# Patient Record
Sex: MALE | Race: WHITE | NOT HISPANIC OR LATINO | Employment: FULL TIME | URBAN - METROPOLITAN AREA
[De-identification: names, ages, dates, MRNs, and addresses within clinical notes are randomized per-mention and may not be internally consistent; named-entity substitution may affect disease eponyms.]

---

## 2021-01-28 ENCOUNTER — TELEPHONE (OUTPATIENT)
Dept: CARDIOLOGY | Facility: MEDICAL CENTER | Age: 61
End: 2021-01-28

## 2021-01-28 NOTE — TELEPHONE ENCOUNTER
Spoke with pt and confirmed this will be first time seeing a cardiologist. Confirmed with pt that all up to date cardiac records are in pts chart.    Appt time and date confirmed.

## 2021-03-08 ENCOUNTER — OFFICE VISIT (OUTPATIENT)
Dept: CARDIOLOGY | Facility: MEDICAL CENTER | Age: 61
End: 2021-03-08
Payer: COMMERCIAL

## 2021-03-08 VITALS
OXYGEN SATURATION: 93 % | RESPIRATION RATE: 18 BRPM | SYSTOLIC BLOOD PRESSURE: 134 MMHG | HEART RATE: 108 BPM | HEIGHT: 74 IN | DIASTOLIC BLOOD PRESSURE: 88 MMHG | BODY MASS INDEX: 40.43 KG/M2 | WEIGHT: 315 LBS

## 2021-03-08 DIAGNOSIS — I51.7 RIGHT HEART ENLARGEMENT: ICD-10-CM

## 2021-03-08 DIAGNOSIS — E66.01 MORBID OBESITY (HCC): ICD-10-CM

## 2021-03-08 DIAGNOSIS — G47.33 OBSTRUCTIVE SLEEP APNEA ON CPAP: ICD-10-CM

## 2021-03-08 DIAGNOSIS — I87.2 CHRONIC VENOUS INSUFFICIENCY: ICD-10-CM

## 2021-03-08 DIAGNOSIS — I10 ESSENTIAL HYPERTENSION, BENIGN: ICD-10-CM

## 2021-03-08 PROCEDURE — 99204 OFFICE O/P NEW MOD 45 MIN: CPT | Performed by: INTERNAL MEDICINE

## 2021-03-08 RX ORDER — FUROSEMIDE 40 MG/1
TABLET ORAL
COMMUNITY
Start: 2021-03-02 | End: 2021-07-26

## 2021-03-08 RX ORDER — SPIRONOLACTONE 50 MG/1
TABLET, FILM COATED ORAL
COMMUNITY
Start: 2021-03-02 | End: 2021-07-26

## 2021-03-08 RX ORDER — HYDROCHLOROTHIAZIDE 12.5 MG/1
TABLET ORAL
COMMUNITY
Start: 2021-02-15 | End: 2021-07-26

## 2021-03-08 RX ORDER — AMOXICILLIN AND CLAVULANATE POTASSIUM 875; 125 MG/1; MG/1
TABLET, FILM COATED ORAL
COMMUNITY
Start: 2021-01-15 | End: 2021-03-08

## 2021-03-08 RX ORDER — POTASSIUM CHLORIDE 1500 MG/1
20 TABLET, EXTENDED RELEASE ORAL
COMMUNITY
Start: 2021-02-16

## 2021-03-08 NOTE — PROGRESS NOTES
Cardiology Initial Consultation Note    Date of note:    3/8/2021    Primary Care Provider: Emily Brady P.A.-C.  Referring Provider: No ref. provider found     Patient Name: Kulwinder Cason     YOB: 1960  MRN:              [id number]    Chief Complaint: Right heart enlargement, chronic lower extremity edema, obesity    Kulwinder Csaon is a 60 y.o. male  patient presented today to establish care for above problems.  For last several months he has been dealing with significant lower extremity edema resistant to diuretics.  He has been using compression socks, diuretics, lymphatic stimulators.  He still has significant lower extremity edema skin changes, weeping sores in his lower extremities.  He has Obesity, working with a nutritionist with diet.  He uses CPAP regularly.    ROS  Positive for fatigue, joint pains, lower extremity swelling    All other systems reviewed and discussed using a comprehensive questionnaire and are negative.     Past medical history, family history, social history, allergies and labs are reviewed and updated as needed as documented below.    History reviewed. No pertinent past medical history.      History reviewed. No pertinent surgical history.      Current Outpatient Medications   Medication Sig Dispense Refill   • furosemide (LASIX) 40 MG Tab      • hydroCHLOROthiazide (HYDRODIURIL) 12.5 MG tablet      • potassium Chloride ER (K-TAB) 20 MEQ Tab CR tablet      • spironolactone (ALDACTONE) 50 MG Tab      • Multiple Vitamins-Minerals (DAILY MULTI PO) Take  by mouth.     • Coenzyme Q10 (COQ10 PO) Take  by mouth.     • aspirin EC (ECOTRIN) 81 MG Tablet Delayed Response Take 81 mg by mouth every day.     • LOSARTAN POTASSIUM PO Take  by mouth.     • amoxicillin-clavulanate (AUGMENTIN) 875-125 MG Tab        No current facility-administered medications for this visit.         Allergies   Allergen Reactions   • Codeine    • Latex          History reviewed. No  "pertinent family history.      Social History     Socioeconomic History   • Marital status: Unknown     Spouse name: Not on file   • Number of children: Not on file   • Years of education: Not on file   • Highest education level: Not on file   Occupational History   • Not on file   Tobacco Use   • Smoking status: Never Smoker   • Smokeless tobacco: Never Used   Substance and Sexual Activity   • Alcohol use: Not Currently   • Drug use: Never   • Sexual activity: Not on file   Other Topics Concern   • Not on file   Social History Narrative   • Not on file     Social Determinants of Health     Financial Resource Strain:    • Difficulty of Paying Living Expenses:    Food Insecurity:    • Worried About Running Out of Food in the Last Year:    • Ran Out of Food in the Last Year:    Transportation Needs:    • Lack of Transportation (Medical):    • Lack of Transportation (Non-Medical):    Physical Activity:    • Days of Exercise per Week:    • Minutes of Exercise per Session:    Stress:    • Feeling of Stress :    Social Connections:    • Frequency of Communication with Friends and Family:    • Frequency of Social Gatherings with Friends and Family:    • Attends L[employer] Services:    • Active Member of Clubs or Organizations:    • Attends Club or Organization Meetings:    • Marital Status:    Intimate Partner Violence:    • Fear of Current or Ex-Partner:    • Emotionally Abused:    • Physically Abused:    • Sexually Abused:          Physical Exam:  Ambulatory Vitals  /88 (BP Location: Left arm, Patient Position: Sitting, BP Cuff Size: Adult)   Pulse (!) 108   Resp 18   Ht 1.88 m (6' 2\")   Wt (!) 218 kg (481 lb)   SpO2 93%    Oxygen Therapy:  Pulse Oximetry: 93 %  BP Readings from Last 4 Encounters:   03/08/21 134/88       Weight/BMI: Body mass index is 61.76 kg/m².  Wt Readings from Last 4 Encounters:   03/08/21 (!) 218 kg (481 lb)       General: Well appearing and in no apparent distress  Head: atrumatic  Eyes: " No conjunctival pallor   ENT: normal external appearance of nose and ears  Neck: JVD absent, carotid bruits absent  Lungs: respiratory sounds  normal, additional breath sounds absent  Heart: Regular rhythm,   No palpable thrills on palpation, murmurs absent, no rubs,   Lower extremity edema 3+.   Abdomen: soft, non tender, non distended.  Extremities/MSK: no clubbing, no cyanosis  Neurological: normal orientation, Gait normal   Psychiatric: Appropriate affect, intact judgement and insight  Skin: Warm extremities      Dr. Beto Chairez notes are reviewed from 2020.    Echocardiogram 2020 reviewed shows normal LV function, mildly dilated right ventricle, no significant TR to quantify RVSP.    EKG from 10/22/2020 reviewed, personally interpreted shows normal sinus rhythm    Medical Decision Makin-year-old male patient with morbid obesity, significant lower extremity edema, chronic venous insufficiency, mild RV enlargement.  His mild RV enlargement is due to obesity, sleep apnea.  The degree of enlargement, absence of TR makes it unlikely severe pulmonary hypertension.  At this time I do not recommend invasive work-up like right heart catheterization.  Recommend adjusting diuretics, watching kidney function carefully to help with his edema.  Recommend aggressive weight loss treatment, strongly consider gastric banding/bypass surgery.  Follow-up in a year with repeat echocardiogram to assess RV function.    No follow-ups on file.    This note was dictated using Dragon speech recognition software.    Anthony BAILEY  Interventional cardiologist  [facility], Bld[facility].  [address] [address]  Phone: [phone]  Fax: [phone]

## 2021-03-08 NOTE — LETTER
[facility]   [address]  [place] [address]  Phone: [phone]  Fax: [phone]              Kulwinder Cason  1960    Encounter Date: 3/8/2021    Anthony Sellers M.D.          PROGRESS NOTE:      Cardiology Initial Consultation Note    Date of note:    3/8/2021    Primary Care Provider: Emily Brady P.A.-C.  Referring Provider: No ref. provider found     Patient Name: Kulwinder Cason     YOB: 1960  MRN:              [id number]    Chief Complaint: Right heart enlargement, chronic lower extremity edema, obesity    Kulwinder Cason is a 60 y.o. male  patient presented today to establish care for above problems.  For last several months he has been dealing with significant lower extremity edema resistant to diuretics.  He has been using compression socks, diuretics, lymphatic stimulators.  He still has significant lower extremity edema skin changes, weeping sores in his lower extremities.  He has Obesity, working with a nutritionist with diet.  He uses CPAP regularly.    ROS  Positive for fatigue, joint pains, lower extremity swelling    All other systems reviewed and discussed using a comprehensive questionnaire and are negative.     Past medical history, family history, social history, allergies and labs are reviewed and updated as needed as documented below.    History reviewed. No pertinent past medical history.      History reviewed. No pertinent surgical history.      Current Outpatient Medications   Medication Sig Dispense Refill   • furosemide (LASIX) 40 MG Tab      • hydroCHLOROthiazide (HYDRODIURIL) 12.5 MG tablet      • potassium Chloride ER (K-TAB) 20 MEQ Tab CR tablet      • spironolactone (ALDACTONE) 50 MG Tab      • Multiple Vitamins-Minerals (DAILY MULTI PO) Take  by mouth.     • Coenzyme Q10 (COQ10 PO) Take  by mouth.     • aspirin EC (ECOTRIN) 81 MG Tablet Delayed Response Take 81 mg by mouth every  "day.     • LOSARTAN POTASSIUM PO Take  by mouth.     • amoxicillin-clavulanate (AUGMENTIN) 875-125 MG Tab        No current facility-administered medications for this visit.         Allergies   Allergen Reactions   • Codeine    • Latex          History reviewed. No pertinent family history.      Social History     Socioeconomic History   • Marital status: Unknown     Spouse name: Not on file   • Number of children: Not on file   • Years of education: Not on file   • Highest education level: Not on file   Occupational History   • Not on file   Tobacco Use   • Smoking status: Never Smoker   • Smokeless tobacco: Never Used   Substance and Sexual Activity   • Alcohol use: Not Currently   • Drug use: Never   • Sexual activity: Not on file   Other Topics Concern   • Not on file   Social History Narrative   • Not on file     Social Determinants of Health     Financial Resource Strain:    • Difficulty of Paying Living Expenses:    Food Insecurity:    • Worried About Running Out of Food in the Last Year:    • Ran Out of Food in the Last Year:    Transportation Needs:    • Lack of Transportation (Medical):    • Lack of Transportation (Non-Medical):    Physical Activity:    • Days of Exercise per Week:    • Minutes of Exercise per Session:    Stress:    • Feeling of Stress :    Social Connections:    • Frequency of Communication with Friends and Family:    • Frequency of Social Gatherings with Friends and Family:    • Attends Mandaeism Services:    • Active Member of Clubs or Organizations:    • Attends Club or Organization Meetings:    • Marital Status:    Intimate Partner Violence:    • Fear of Current or Ex-Partner:    • Emotionally Abused:    • Physically Abused:    • Sexually Abused:          Physical Exam:  Ambulatory Vitals  /88 (BP Location: Left arm, Patient Position: Sitting, BP Cuff Size: Adult)   Pulse (!) 108   Resp 18   Ht 1.88 m (6' 2\")   Wt (!) 218 kg (481 lb)   SpO2 93%    Oxygen Therapy:  Pulse " Oximetry: 93 %  BP Readings from Last 4 Encounters:   21 134/88       Weight/BMI: Body mass index is 61.76 kg/m².  Wt Readings from Last 4 Encounters:   21 (!) 218 kg (481 lb)       General: Well appearing and in no apparent distress  Head: atrumatic  Eyes: No conjunctival pallor   ENT: normal external appearance of nose and ears  Neck: JVD absent, carotid bruits absent  Lungs: respiratory sounds  normal, additional breath sounds absent  Heart: Regular rhythm,   No palpable thrills on palpation, murmurs absent, no rubs,   Lower extremity edema 3+.   Abdomen: soft, non tender, non distended.  Extremities/MSK: no clubbing, no cyanosis  Neurological: normal orientation, Gait normal   Psychiatric: Appropriate affect, intact judgement and insight  Skin: Warm extremities      Dr. Beto Chairez notes are reviewed from 2020.    Echocardiogram 2020 reviewed shows normal LV function, mildly dilated right ventricle, no significant TR to quantify RVSP.    EKG from 10/22/2020 reviewed, personally interpreted shows normal sinus rhythm    Medical Decision Makin-year-old male patient with morbid obesity, significant lower extremity edema, chronic venous insufficiency, mild RV enlargement.  His mild RV enlargement is due to obesity, sleep apnea.  The degree of enlargement, absence of TR makes it unlikely severe pulmonary hypertension.  At this time I do not recommend invasive work-up like right heart catheterization.  Recommend adjusting diuretics, watching kidney function carefully to help with his edema.  Recommend aggressive weight loss treatment, strongly consider gastric banding/bypass surgery.  Follow-up in a year with repeat echocardiogram to assess RV function.    No follow-ups on file.    This note was dictated using Dragon speech recognition software.    Anthony BAILEY  Interventional cardiologist  [facility]  [place] [facility], [facility].  [address] [place] [address]  Phone: [phone]  Fax: [phone]                    Beto Chairez M.D.  [facility]  [facility] [address]  Via Fax: [phone]

## 2021-07-26 ENCOUNTER — PRE-ADMISSION TESTING (OUTPATIENT)
Dept: ADMISSIONS | Facility: MEDICAL CENTER | Age: 61
DRG: 619 | End: 2021-07-26
Attending: COLON & RECTAL SURGERY
Payer: COMMERCIAL

## 2021-07-26 VITALS — BODY MASS INDEX: 41.75 KG/M2 | HEIGHT: 73 IN | WEIGHT: 315 LBS

## 2021-07-26 RX ORDER — LOSARTAN POTASSIUM 100 MG/1
100 TABLET ORAL
COMMUNITY

## 2021-08-03 ENCOUNTER — PRE-ADMISSION TESTING (OUTPATIENT)
Dept: ADMISSIONS | Facility: MEDICAL CENTER | Age: 61
DRG: 619 | End: 2021-08-03
Attending: COLON & RECTAL SURGERY
Payer: COMMERCIAL

## 2021-08-03 ENCOUNTER — HOSPITAL ENCOUNTER (OUTPATIENT)
Dept: RADIOLOGY | Facility: MEDICAL CENTER | Age: 61
DRG: 619 | End: 2021-08-03
Attending: COLON & RECTAL SURGERY
Payer: COMMERCIAL

## 2021-08-03 DIAGNOSIS — Z01.812 PRE-OPERATIVE LABORATORY EXAMINATION: ICD-10-CM

## 2021-08-03 DIAGNOSIS — Z01.811 PRE-OPERATIVE RESPIRATORY EXAMINATION: ICD-10-CM

## 2021-08-03 LAB
ALBUMIN SERPL BCP-MCNC: 3.6 G/DL (ref 3.2–4.9)
ALBUMIN/GLOB SERPL: 0.9 G/DL
ALP SERPL-CCNC: 71 U/L (ref 30–99)
ALT SERPL-CCNC: 17 U/L (ref 2–50)
ANION GAP SERPL CALC-SCNC: 14 MMOL/L (ref 7–16)
AST SERPL-CCNC: 21 U/L (ref 12–45)
BASOPHILS # BLD AUTO: 0.6 % (ref 0–1.8)
BASOPHILS # BLD: 0.06 K/UL (ref 0–0.12)
BILIRUB SERPL-MCNC: 0.7 MG/DL (ref 0.1–1.5)
BUN SERPL-MCNC: 11 MG/DL (ref 8–22)
CALCIUM SERPL-MCNC: 9.1 MG/DL (ref 8.5–10.5)
CHLORIDE SERPL-SCNC: 99 MMOL/L (ref 96–112)
CO2 SERPL-SCNC: 24 MMOL/L (ref 20–33)
CREAT SERPL-MCNC: 1.15 MG/DL (ref 0.5–1.4)
EOSINOPHIL # BLD AUTO: 0.16 K/UL (ref 0–0.51)
EOSINOPHIL NFR BLD: 1.6 % (ref 0–6.9)
ERYTHROCYTE [DISTWIDTH] IN BLOOD BY AUTOMATED COUNT: 52.9 FL (ref 35.9–50)
GLOBULIN SER CALC-MCNC: 3.9 G/DL (ref 1.9–3.5)
GLUCOSE SERPL-MCNC: 103 MG/DL (ref 65–99)
HCT VFR BLD AUTO: 47.9 % (ref 42–52)
HGB BLD-MCNC: 14.8 G/DL (ref 14–18)
IMM GRANULOCYTES # BLD AUTO: 0.06 K/UL (ref 0–0.11)
IMM GRANULOCYTES NFR BLD AUTO: 0.6 % (ref 0–0.9)
INR PPP: 1.16 (ref 0.87–1.13)
LYMPHOCYTES # BLD AUTO: 1.55 K/UL (ref 1–4.8)
LYMPHOCYTES NFR BLD: 15.1 % (ref 22–41)
MCH RBC QN AUTO: 27.5 PG (ref 27–33)
MCHC RBC AUTO-ENTMCNC: 30.9 G/DL (ref 33.7–35.3)
MCV RBC AUTO: 89 FL (ref 81.4–97.8)
MONOCYTES # BLD AUTO: 0.95 K/UL (ref 0–0.85)
MONOCYTES NFR BLD AUTO: 9.3 % (ref 0–13.4)
NEUTROPHILS # BLD AUTO: 7.46 K/UL (ref 1.82–7.42)
NEUTROPHILS NFR BLD: 72.8 % (ref 44–72)
NRBC # BLD AUTO: 0 K/UL
NRBC BLD-RTO: 0 /100 WBC
PLATELET # BLD AUTO: 318 K/UL (ref 164–446)
PMV BLD AUTO: 8.9 FL (ref 9–12.9)
POTASSIUM SERPL-SCNC: 4.6 MMOL/L (ref 3.6–5.5)
PROT SERPL-MCNC: 7.5 G/DL (ref 6–8.2)
PROTHROMBIN TIME: 14.5 SEC (ref 12–14.6)
RBC # BLD AUTO: 5.38 M/UL (ref 4.7–6.1)
SARS-COV+SARS-COV-2 AG RESP QL IA.RAPID: NOTDETECTED
SODIUM SERPL-SCNC: 137 MMOL/L (ref 135–145)
SPECIMEN SOURCE: NORMAL
WBC # BLD AUTO: 10.2 K/UL (ref 4.8–10.8)

## 2021-08-03 PROCEDURE — 87426 SARSCOV CORONAVIRUS AG IA: CPT

## 2021-08-03 PROCEDURE — 85610 PROTHROMBIN TIME: CPT

## 2021-08-03 PROCEDURE — 36415 COLL VENOUS BLD VENIPUNCTURE: CPT

## 2021-08-03 PROCEDURE — 80053 COMPREHEN METABOLIC PANEL: CPT

## 2021-08-03 PROCEDURE — 71045 X-RAY EXAM CHEST 1 VIEW: CPT

## 2021-08-03 PROCEDURE — 85025 COMPLETE CBC W/AUTO DIFF WBC: CPT

## 2021-08-04 ENCOUNTER — ANESTHESIA (OUTPATIENT)
Dept: SURGERY | Facility: MEDICAL CENTER | Age: 61
DRG: 619 | End: 2021-08-04
Payer: COMMERCIAL

## 2021-08-04 ENCOUNTER — APPOINTMENT (OUTPATIENT)
Dept: RADIOLOGY | Facility: MEDICAL CENTER | Age: 61
DRG: 619 | End: 2021-08-04
Attending: INTERNAL MEDICINE
Payer: COMMERCIAL

## 2021-08-04 ENCOUNTER — HOSPITAL ENCOUNTER (INPATIENT)
Facility: MEDICAL CENTER | Age: 61
LOS: 5 days | DRG: 619 | End: 2021-08-09
Attending: COLON & RECTAL SURGERY | Admitting: COLON & RECTAL SURGERY
Payer: COMMERCIAL

## 2021-08-04 ENCOUNTER — ANESTHESIA EVENT (OUTPATIENT)
Dept: SURGERY | Facility: MEDICAL CENTER | Age: 61
DRG: 619 | End: 2021-08-04
Payer: COMMERCIAL

## 2021-08-04 DIAGNOSIS — G47.33 OBSTRUCTIVE SLEEP APNEA ON CPAP: ICD-10-CM

## 2021-08-04 DIAGNOSIS — J96.22 ACUTE ON CHRONIC RESPIRATORY FAILURE WITH HYPOXIA AND HYPERCAPNIA (HCC): ICD-10-CM

## 2021-08-04 DIAGNOSIS — E66.01 MORBID OBESITY (HCC): ICD-10-CM

## 2021-08-04 DIAGNOSIS — J96.21 ACUTE ON CHRONIC RESPIRATORY FAILURE WITH HYPOXIA AND HYPERCAPNIA (HCC): ICD-10-CM

## 2021-08-04 DIAGNOSIS — Z98.84 S/P LAPAROSCOPIC SLEEVE GASTRECTOMY: ICD-10-CM

## 2021-08-04 LAB
BASE EXCESS BLDA CALC-SCNC: 1 MMOL/L (ref -4–3)
BASE EXCESS BLDA CALC-SCNC: 2 MMOL/L (ref -4–3)
BASE EXCESS BLDV CALC-SCNC: 3 MMOL/L (ref -4–3)
BODY TEMPERATURE: ABNORMAL DEGREES
CA-I BLD ISE-SCNC: 1.66 MMOL/L (ref 1.1–1.3)
CO2 BLDA-SCNC: 30 MMOL/L (ref 20–33)
CO2 BLDA-SCNC: 32 MMOL/L (ref 20–33)
CO2 BLDV-SCNC: 31 MMOL/L (ref 20–33)
DELSYS IDSYS: ABNORMAL
HCO3 BLDA-SCNC: 28.7 MMOL/L (ref 17–25)
HCO3 BLDA-SCNC: 30.3 MMOL/L (ref 17–25)
HCO3 BLDV-SCNC: 29.5 MMOL/L (ref 24–28)
HCT VFR BLD CALC: 46 % (ref 42–52)
HGB BLD-MCNC: 15.6 G/DL (ref 14–18)
HOROWITZ INDEX BLDA+IHG-RTO: 188 MM[HG]
O2/TOTAL GAS SETTING VFR VENT: 40 %
PCO2 BLDA: 55.1 MMHG (ref 26–37)
PCO2 BLDA: 61.7 MMHG (ref 26–37)
PCO2 BLDV: 48.6 MMHG (ref 41–51)
PCO2 TEMP ADJ BLDA: 55.1 MMHG (ref 26–37)
PCO2 TEMP ADJ BLDA: 64.6 MMHG (ref 26–37)
PH BLDA: 7.3 [PH] (ref 7.4–7.5)
PH BLDA: 7.33 [PH] (ref 7.4–7.5)
PH BLDV: 7.39 [PH] (ref 7.31–7.45)
PH TEMP ADJ BLDA: 7.29 [PH] (ref 7.4–7.5)
PH TEMP ADJ BLDA: 7.33 [PH] (ref 7.4–7.5)
PO2 BLDA: 67 MMHG (ref 64–87)
PO2 BLDA: 75 MMHG (ref 64–87)
PO2 BLDV: 93 MMHG (ref 25–40)
PO2 TEMP ADJ BLDA: 67 MMHG (ref 64–87)
PO2 TEMP ADJ BLDA: 80 MMHG (ref 64–87)
POTASSIUM BLD-SCNC: 4.5 MMOL/L (ref 3.6–5.5)
SAO2 % BLDA: 91 % (ref 93–99)
SAO2 % BLDA: 93 % (ref 93–99)
SAO2 % BLDV: 97 %
SODIUM BLD-SCNC: 137 MMOL/L (ref 135–145)
SPECIMEN DRAWN FROM PATIENT: ABNORMAL

## 2021-08-04 PROCEDURE — 502240 HCHG MISC OR SUPPLY RC 0272: Performed by: COLON & RECTAL SURGERY

## 2021-08-04 PROCEDURE — 700111 HCHG RX REV CODE 636 W/ 250 OVERRIDE (IP): Performed by: STUDENT IN AN ORGANIZED HEALTH CARE EDUCATION/TRAINING PROGRAM

## 2021-08-04 PROCEDURE — A9270 NON-COVERED ITEM OR SERVICE: HCPCS | Performed by: COLON & RECTAL SURGERY

## 2021-08-04 PROCEDURE — 700105 HCHG RX REV CODE 258: Performed by: INTERNAL MEDICINE

## 2021-08-04 PROCEDURE — 700111 HCHG RX REV CODE 636 W/ 250 OVERRIDE (IP): Performed by: INTERNAL MEDICINE

## 2021-08-04 PROCEDURE — 700102 HCHG RX REV CODE 250 W/ 637 OVERRIDE(OP): Performed by: COLON & RECTAL SURGERY

## 2021-08-04 PROCEDURE — 82330 ASSAY OF CALCIUM: CPT

## 2021-08-04 PROCEDURE — 502570 HCHG PACK, GASTRIC BANDING: Performed by: COLON & RECTAL SURGERY

## 2021-08-04 PROCEDURE — 160036 HCHG PACU - EA ADDL 30 MINS PHASE I: Performed by: COLON & RECTAL SURGERY

## 2021-08-04 PROCEDURE — 94660 CPAP INITIATION&MGMT: CPT

## 2021-08-04 PROCEDURE — 501497 HCHG SURGICLIP: Performed by: COLON & RECTAL SURGERY

## 2021-08-04 PROCEDURE — 71045 X-RAY EXAM CHEST 1 VIEW: CPT

## 2021-08-04 PROCEDURE — 160048 HCHG OR STATISTICAL LEVEL 1-5: Performed by: COLON & RECTAL SURGERY

## 2021-08-04 PROCEDURE — 501583 HCHG TROCAR, THRD CAN&SEAL 5X100: Performed by: COLON & RECTAL SURGERY

## 2021-08-04 PROCEDURE — 160041 HCHG SURGERY MINUTES - EA ADDL 1 MIN LEVEL 4: Performed by: COLON & RECTAL SURGERY

## 2021-08-04 PROCEDURE — 160035 HCHG PACU - 1ST 60 MINS PHASE I: Performed by: COLON & RECTAL SURGERY

## 2021-08-04 PROCEDURE — 700111 HCHG RX REV CODE 636 W/ 250 OVERRIDE (IP): Performed by: COLON & RECTAL SURGERY

## 2021-08-04 PROCEDURE — 160002 HCHG RECOVERY MINUTES (STAT): Performed by: COLON & RECTAL SURGERY

## 2021-08-04 PROCEDURE — 85014 HEMATOCRIT: CPT

## 2021-08-04 PROCEDURE — 700101 HCHG RX REV CODE 250: Performed by: COLON & RECTAL SURGERY

## 2021-08-04 PROCEDURE — 501570 HCHG TROCAR, SEPARATOR: Performed by: COLON & RECTAL SURGERY

## 2021-08-04 PROCEDURE — 160029 HCHG SURGERY MINUTES - 1ST 30 MINS LEVEL 4: Performed by: COLON & RECTAL SURGERY

## 2021-08-04 PROCEDURE — 700101 HCHG RX REV CODE 250: Performed by: STUDENT IN AN ORGANIZED HEALTH CARE EDUCATION/TRAINING PROGRAM

## 2021-08-04 PROCEDURE — 501338 HCHG SHEARS, ENDO: Performed by: COLON & RECTAL SURGERY

## 2021-08-04 PROCEDURE — 501399 HCHG SPECIMAN BAG, ENDO CATC: Performed by: COLON & RECTAL SURGERY

## 2021-08-04 PROCEDURE — 770022 HCHG ROOM/CARE - ICU (200)

## 2021-08-04 PROCEDURE — 700105 HCHG RX REV CODE 258: Performed by: COLON & RECTAL SURGERY

## 2021-08-04 PROCEDURE — 99291 CRITICAL CARE FIRST HOUR: CPT | Performed by: INTERNAL MEDICINE

## 2021-08-04 PROCEDURE — 36600 WITHDRAWAL OF ARTERIAL BLOOD: CPT

## 2021-08-04 PROCEDURE — 84132 ASSAY OF SERUM POTASSIUM: CPT

## 2021-08-04 PROCEDURE — 700111 HCHG RX REV CODE 636 W/ 250 OVERRIDE (IP): Performed by: NURSE PRACTITIONER

## 2021-08-04 PROCEDURE — 82803 BLOOD GASES ANY COMBINATION: CPT

## 2021-08-04 PROCEDURE — 0DB64Z3 EXCISION OF STOMACH, PERCUTANEOUS ENDOSCOPIC APPROACH, VERTICAL: ICD-10-PCS | Performed by: COLON & RECTAL SURGERY

## 2021-08-04 PROCEDURE — 88307 TISSUE EXAM BY PATHOLOGIST: CPT

## 2021-08-04 PROCEDURE — 5A09357 ASSISTANCE WITH RESPIRATORY VENTILATION, LESS THAN 24 CONSECUTIVE HOURS, CONTINUOUS POSITIVE AIRWAY PRESSURE: ICD-10-PCS | Performed by: COLON & RECTAL SURGERY

## 2021-08-04 PROCEDURE — 84295 ASSAY OF SERUM SODIUM: CPT

## 2021-08-04 PROCEDURE — 160009 HCHG ANES TIME/MIN: Performed by: COLON & RECTAL SURGERY

## 2021-08-04 PROCEDURE — 501838 HCHG SUTURE GENERAL: Performed by: COLON & RECTAL SURGERY

## 2021-08-04 RX ORDER — KETAMINE HYDROCHLORIDE 50 MG/ML
INJECTION, SOLUTION INTRAMUSCULAR; INTRAVENOUS PRN
Status: DISCONTINUED | OUTPATIENT
Start: 2021-08-04 | End: 2021-08-04 | Stop reason: SURG

## 2021-08-04 RX ORDER — FUROSEMIDE 10 MG/ML
40 INJECTION INTRAMUSCULAR; INTRAVENOUS ONCE
Status: COMPLETED | OUTPATIENT
Start: 2021-08-04 | End: 2021-08-04

## 2021-08-04 RX ORDER — CEFAZOLIN SODIUM 1 G/3ML
INJECTION, POWDER, FOR SOLUTION INTRAMUSCULAR; INTRAVENOUS PRN
Status: DISCONTINUED | OUTPATIENT
Start: 2021-08-04 | End: 2021-08-04 | Stop reason: SURG

## 2021-08-04 RX ORDER — DIPHENHYDRAMINE HYDROCHLORIDE 50 MG/ML
12.5 INJECTION INTRAMUSCULAR; INTRAVENOUS
Status: DISCONTINUED | OUTPATIENT
Start: 2021-08-04 | End: 2021-08-04 | Stop reason: HOSPADM

## 2021-08-04 RX ORDER — GABAPENTIN 300 MG/1
300 CAPSULE ORAL 3 TIMES DAILY
Status: DISCONTINUED | OUTPATIENT
Start: 2021-08-04 | End: 2021-08-09 | Stop reason: HOSPADM

## 2021-08-04 RX ORDER — DIPHENHYDRAMINE HYDROCHLORIDE 50 MG/ML
12.5 INJECTION INTRAMUSCULAR; INTRAVENOUS EVERY 6 HOURS PRN
Status: DISCONTINUED | OUTPATIENT
Start: 2021-08-04 | End: 2021-08-04

## 2021-08-04 RX ORDER — PHENYLEPHRINE HYDROCHLORIDE 10 MG/ML
INJECTION, SOLUTION INTRAMUSCULAR; INTRAVENOUS; SUBCUTANEOUS PRN
Status: DISCONTINUED | OUTPATIENT
Start: 2021-08-04 | End: 2021-08-04 | Stop reason: SURG

## 2021-08-04 RX ORDER — SODIUM CHLORIDE, SODIUM LACTATE, POTASSIUM CHLORIDE, AND CALCIUM CHLORIDE .6; .31; .03; .02 G/100ML; G/100ML; G/100ML; G/100ML
500 INJECTION, SOLUTION INTRAVENOUS
Status: DISCONTINUED | OUTPATIENT
Start: 2021-08-04 | End: 2021-08-07

## 2021-08-04 RX ORDER — PROMETHAZINE HYDROCHLORIDE 25 MG/1
25 SUPPOSITORY RECTAL EVERY 4 HOURS PRN
Status: DISCONTINUED | OUTPATIENT
Start: 2021-08-04 | End: 2021-08-09 | Stop reason: HOSPADM

## 2021-08-04 RX ORDER — ACETAMINOPHEN 500 MG
1000 TABLET ORAL EVERY 6 HOURS PRN
Status: DISCONTINUED | OUTPATIENT
Start: 2021-08-09 | End: 2021-08-09 | Stop reason: HOSPADM

## 2021-08-04 RX ORDER — BUPIVACAINE HYDROCHLORIDE AND EPINEPHRINE 5; 5 MG/ML; UG/ML
INJECTION, SOLUTION PERINEURAL
Status: DISCONTINUED | OUTPATIENT
Start: 2021-08-04 | End: 2021-08-04 | Stop reason: HOSPADM

## 2021-08-04 RX ORDER — SCOLOPAMINE TRANSDERMAL SYSTEM 1 MG/1
1 PATCH, EXTENDED RELEASE TRANSDERMAL
Status: COMPLETED | OUTPATIENT
Start: 2021-08-04 | End: 2021-08-07

## 2021-08-04 RX ORDER — DEXAMETHASONE SODIUM PHOSPHATE 4 MG/ML
INJECTION, SOLUTION INTRA-ARTICULAR; INTRALESIONAL; INTRAMUSCULAR; INTRAVENOUS; SOFT TISSUE PRN
Status: DISCONTINUED | OUTPATIENT
Start: 2021-08-04 | End: 2021-08-04 | Stop reason: SURG

## 2021-08-04 RX ORDER — OXYCODONE HCL 5 MG/5 ML
10 SOLUTION, ORAL ORAL
Status: DISCONTINUED | OUTPATIENT
Start: 2021-08-04 | End: 2021-08-04 | Stop reason: HOSPADM

## 2021-08-04 RX ORDER — DIPHENHYDRAMINE HCL 25 MG
25 TABLET ORAL EVERY 6 HOURS PRN
Status: DISCONTINUED | OUTPATIENT
Start: 2021-08-04 | End: 2021-08-07

## 2021-08-04 RX ORDER — LABETALOL HYDROCHLORIDE 5 MG/ML
5 INJECTION, SOLUTION INTRAVENOUS
Status: DISCONTINUED | OUTPATIENT
Start: 2021-08-04 | End: 2021-08-04 | Stop reason: HOSPADM

## 2021-08-04 RX ORDER — MEPERIDINE HYDROCHLORIDE 25 MG/ML
12.5 INJECTION INTRAMUSCULAR; INTRAVENOUS; SUBCUTANEOUS
Status: DISCONTINUED | OUTPATIENT
Start: 2021-08-04 | End: 2021-08-04 | Stop reason: HOSPADM

## 2021-08-04 RX ORDER — IPRATROPIUM BROMIDE AND ALBUTEROL SULFATE 2.5; .5 MG/3ML; MG/3ML
3 SOLUTION RESPIRATORY (INHALATION)
Status: DISCONTINUED | OUTPATIENT
Start: 2021-08-04 | End: 2021-08-04 | Stop reason: HOSPADM

## 2021-08-04 RX ORDER — ONDANSETRON 2 MG/ML
4 INJECTION INTRAMUSCULAR; INTRAVENOUS EVERY 4 HOURS PRN
Status: DISCONTINUED | OUTPATIENT
Start: 2021-08-04 | End: 2021-08-09 | Stop reason: HOSPADM

## 2021-08-04 RX ORDER — ACETAMINOPHEN 10 MG/ML
1 INJECTION, SOLUTION INTRAVENOUS ONCE
Status: COMPLETED | OUTPATIENT
Start: 2021-08-04 | End: 2021-08-04

## 2021-08-04 RX ORDER — ONDANSETRON 2 MG/ML
4 INJECTION INTRAMUSCULAR; INTRAVENOUS
Status: COMPLETED | OUTPATIENT
Start: 2021-08-04 | End: 2021-08-04

## 2021-08-04 RX ORDER — NALOXONE HYDROCHLORIDE 0.4 MG/ML
INJECTION, SOLUTION INTRAMUSCULAR; INTRAVENOUS; SUBCUTANEOUS
Status: COMPLETED
Start: 2021-08-04 | End: 2021-08-05

## 2021-08-04 RX ORDER — HALOPERIDOL 5 MG/ML
1 INJECTION INTRAMUSCULAR EVERY 6 HOURS PRN
Status: DISCONTINUED | OUTPATIENT
Start: 2021-08-04 | End: 2021-08-07

## 2021-08-04 RX ORDER — HYDRALAZINE HYDROCHLORIDE 20 MG/ML
5 INJECTION INTRAMUSCULAR; INTRAVENOUS
Status: DISCONTINUED | OUTPATIENT
Start: 2021-08-04 | End: 2021-08-04 | Stop reason: HOSPADM

## 2021-08-04 RX ORDER — OXYCODONE HCL 10 MG/1
10 TABLET, FILM COATED, EXTENDED RELEASE ORAL ONCE
Status: COMPLETED | OUTPATIENT
Start: 2021-08-04 | End: 2021-08-04

## 2021-08-04 RX ORDER — HYDROMORPHONE HYDROCHLORIDE 1 MG/ML
0.5 INJECTION, SOLUTION INTRAMUSCULAR; INTRAVENOUS; SUBCUTANEOUS
Status: DISCONTINUED | OUTPATIENT
Start: 2021-08-04 | End: 2021-08-09 | Stop reason: HOSPADM

## 2021-08-04 RX ORDER — GABAPENTIN 300 MG/1
300 CAPSULE ORAL ONCE
Status: COMPLETED | OUTPATIENT
Start: 2021-08-04 | End: 2021-08-04

## 2021-08-04 RX ORDER — METOPROLOL TARTRATE 1 MG/ML
1 INJECTION, SOLUTION INTRAVENOUS
Status: DISCONTINUED | OUTPATIENT
Start: 2021-08-04 | End: 2021-08-04 | Stop reason: HOSPADM

## 2021-08-04 RX ORDER — LABETALOL HYDROCHLORIDE 5 MG/ML
10-20 INJECTION, SOLUTION INTRAVENOUS EVERY 4 HOURS PRN
Status: DISCONTINUED | OUTPATIENT
Start: 2021-08-04 | End: 2021-08-09 | Stop reason: HOSPADM

## 2021-08-04 RX ORDER — ACETAMINOPHEN 10 MG/ML
1000 INJECTION, SOLUTION INTRAVENOUS EVERY 6 HOURS
Status: COMPLETED | OUTPATIENT
Start: 2021-08-04 | End: 2021-08-05

## 2021-08-04 RX ORDER — OXYCODONE HCL 5 MG/5 ML
5 SOLUTION, ORAL ORAL
Status: DISCONTINUED | OUTPATIENT
Start: 2021-08-04 | End: 2021-08-04 | Stop reason: HOSPADM

## 2021-08-04 RX ORDER — ACETAMINOPHEN 500 MG
1000 TABLET ORAL EVERY 6 HOURS
Status: DISPENSED | OUTPATIENT
Start: 2021-08-05 | End: 2021-08-09

## 2021-08-04 RX ORDER — DIPHENHYDRAMINE HYDROCHLORIDE 50 MG/ML
25 INJECTION INTRAMUSCULAR; INTRAVENOUS EVERY 6 HOURS PRN
Status: DISCONTINUED | OUTPATIENT
Start: 2021-08-04 | End: 2021-08-07

## 2021-08-04 RX ORDER — SODIUM CHLORIDE, SODIUM LACTATE, POTASSIUM CHLORIDE, CALCIUM CHLORIDE 600; 310; 30; 20 MG/100ML; MG/100ML; MG/100ML; MG/100ML
INJECTION, SOLUTION INTRAVENOUS CONTINUOUS
Status: DISCONTINUED | OUTPATIENT
Start: 2021-08-04 | End: 2021-08-04

## 2021-08-04 RX ORDER — HALOPERIDOL 5 MG/ML
1 INJECTION INTRAMUSCULAR
Status: DISCONTINUED | OUTPATIENT
Start: 2021-08-04 | End: 2021-08-04 | Stop reason: HOSPADM

## 2021-08-04 RX ORDER — OXYCODONE HCL 5 MG/5 ML
10 SOLUTION, ORAL ORAL
Status: DISCONTINUED | OUTPATIENT
Start: 2021-08-04 | End: 2021-08-09 | Stop reason: HOSPADM

## 2021-08-04 RX ORDER — OXYCODONE HCL 5 MG/5 ML
5 SOLUTION, ORAL ORAL
Status: DISCONTINUED | OUTPATIENT
Start: 2021-08-04 | End: 2021-08-09 | Stop reason: HOSPADM

## 2021-08-04 RX ORDER — SIMETHICONE 80 MG
80 TABLET,CHEWABLE ORAL 3 TIMES DAILY PRN
Status: DISCONTINUED | OUTPATIENT
Start: 2021-08-04 | End: 2021-08-09 | Stop reason: HOSPADM

## 2021-08-04 RX ORDER — ONDANSETRON 2 MG/ML
INJECTION INTRAMUSCULAR; INTRAVENOUS PRN
Status: DISCONTINUED | OUTPATIENT
Start: 2021-08-04 | End: 2021-08-04 | Stop reason: SURG

## 2021-08-04 RX ORDER — LOSARTAN POTASSIUM 50 MG/1
100 TABLET ORAL
Status: DISCONTINUED | OUTPATIENT
Start: 2021-08-04 | End: 2021-08-09 | Stop reason: HOSPADM

## 2021-08-04 RX ORDER — CALCIUM CARBONATE 500 MG/1
500 TABLET, CHEWABLE ORAL
Status: DISCONTINUED | OUTPATIENT
Start: 2021-08-04 | End: 2021-08-09 | Stop reason: HOSPADM

## 2021-08-04 RX ADMIN — DEXAMETHASONE SODIUM PHOSPHATE 10 MG: 4 INJECTION, SOLUTION INTRA-ARTICULAR; INTRALESIONAL; INTRAMUSCULAR; INTRAVENOUS; SOFT TISSUE at 15:51

## 2021-08-04 RX ADMIN — SODIUM CHLORIDE, POTASSIUM CHLORIDE, SODIUM LACTATE AND CALCIUM CHLORIDE: 600; 310; 30; 20 INJECTION, SOLUTION INTRAVENOUS at 14:51

## 2021-08-04 RX ADMIN — HALOPERIDOL LACTATE 1 MG: 5 INJECTION, SOLUTION INTRAMUSCULAR at 17:26

## 2021-08-04 RX ADMIN — SCOPALAMINE 1 PATCH: 1 PATCH, EXTENDED RELEASE TRANSDERMAL at 14:15

## 2021-08-04 RX ADMIN — ACETAMINOPHEN 1000 MG: 10 INJECTION, SOLUTION INTRAVENOUS at 20:15

## 2021-08-04 RX ADMIN — OXYCODONE HYDROCHLORIDE 10 MG: 10 TABLET, FILM COATED, EXTENDED RELEASE ORAL at 14:16

## 2021-08-04 RX ADMIN — FENTANYL CITRATE 25 MCG: 50 INJECTION INTRAMUSCULAR; INTRAVENOUS at 18:24

## 2021-08-04 RX ADMIN — FENTANYL CITRATE 100 MCG: 50 INJECTION, SOLUTION INTRAMUSCULAR; INTRAVENOUS at 15:49

## 2021-08-04 RX ADMIN — ONDANSETRON 4 MG: 2 INJECTION INTRAMUSCULAR; INTRAVENOUS at 15:51

## 2021-08-04 RX ADMIN — CEFAZOLIN 3 G: 330 INJECTION, POWDER, FOR SOLUTION INTRAMUSCULAR; INTRAVENOUS at 15:50

## 2021-08-04 RX ADMIN — ONDANSETRON 4 MG: 2 INJECTION INTRAMUSCULAR; INTRAVENOUS at 17:22

## 2021-08-04 RX ADMIN — FUROSEMIDE 40 MG: 10 INJECTION, SOLUTION INTRAMUSCULAR; INTRAVENOUS at 20:15

## 2021-08-04 RX ADMIN — FAMOTIDINE 20 MG: 10 INJECTION INTRAVENOUS at 19:00

## 2021-08-04 RX ADMIN — SUGAMMADEX 200 MG: 100 INJECTION, SOLUTION INTRAVENOUS at 16:35

## 2021-08-04 RX ADMIN — POTASSIUM CHLORIDE: 2 INJECTION, SOLUTION, CONCENTRATE INTRAVENOUS at 20:47

## 2021-08-04 RX ADMIN — SUGAMMADEX 200 MG: 100 INJECTION, SOLUTION INTRAVENOUS at 17:06

## 2021-08-04 RX ADMIN — PHENYLEPHRINE HYDROCHLORIDE 200 MCG: 10 INJECTION INTRAVENOUS at 16:02

## 2021-08-04 RX ADMIN — ACETAMINOPHEN 1 G: 10 INJECTION, SOLUTION INTRAVENOUS at 14:48

## 2021-08-04 RX ADMIN — FENTANYL CITRATE 50 MCG: 50 INJECTION, SOLUTION INTRAMUSCULAR; INTRAVENOUS at 18:12

## 2021-08-04 RX ADMIN — NALOXONE HYDROCHLORIDE 0.4 MG: 0.4 INJECTION, SOLUTION INTRAMUSCULAR; INTRAVENOUS; SUBCUTANEOUS at 17:05

## 2021-08-04 RX ADMIN — ROCURONIUM BROMIDE 50 MG: 10 INJECTION, SOLUTION INTRAVENOUS at 15:51

## 2021-08-04 RX ADMIN — FENTANYL CITRATE 25 MCG: 50 INJECTION INTRAMUSCULAR; INTRAVENOUS at 18:09

## 2021-08-04 RX ADMIN — GABAPENTIN 300 MG: 300 CAPSULE ORAL at 14:16

## 2021-08-04 RX ADMIN — KETAMINE HYDROCHLORIDE 100 MG: 50 INJECTION INTRAMUSCULAR; INTRAVENOUS at 15:49

## 2021-08-04 ASSESSMENT — ENCOUNTER SYMPTOMS
DEPRESSION: 0
ABDOMINAL PAIN: 1
SORE THROAT: 0
FOCAL WEAKNESS: 0
SPEECH CHANGE: 0
CHILLS: 0
SPUTUM PRODUCTION: 0
FEVER: 0
NAUSEA: 1
FLANK PAIN: 0
DIZZINESS: 0
WHEEZING: 0
VOMITING: 0
HEADACHES: 0
BRUISES/BLEEDS EASILY: 0
BACK PAIN: 0
SHORTNESS OF BREATH: 1
COUGH: 0
BLURRED VISION: 0

## 2021-08-04 ASSESSMENT — PULMONARY FUNCTION TESTS
EPAP_CMH2O: 10
EPAP_CMH2O: 10

## 2021-08-04 ASSESSMENT — FIBROSIS 4 INDEX
FIB4 SCORE: 0.93
FIB4 SCORE: 0.93

## 2021-08-04 ASSESSMENT — PATIENT HEALTH QUESTIONNAIRE - PHQ9
2. FEELING DOWN, DEPRESSED, IRRITABLE, OR HOPELESS: NOT AT ALL
SUM OF ALL RESPONSES TO PHQ9 QUESTIONS 1 AND 2: 0
1. LITTLE INTEREST OR PLEASURE IN DOING THINGS: NOT AT ALL

## 2021-08-04 ASSESSMENT — PAIN DESCRIPTION - PAIN TYPE
TYPE: ACUTE PAIN
TYPE: ACUTE PAIN
TYPE: SURGICAL PAIN
TYPE: SURGICAL PAIN

## 2021-08-04 NOTE — OP REPORT
NAME:  Kulwinder Cason  MRN:  [id number]  :  1960      DATE OF OPERATION: 2021    PREOPERATIVE DIAGNOSIS: Morbid Obesity with medical sequelae    POSTOPERATIVE DIAGNOSIS: Morbid Obesity with medical sequelae    OPERATION PERFORMED: 1.  Laparoscopic Sleeve Gastrectomy    SURGEON: Christian Clarke MD    ASSISTANT:  DUNCAN Tim PA-C    ANESTHESIOLOGIST:  Anesthesiologist: Tobi Chandra M.D.    ANESTHESIA: General endotracheal anesthesia.     SPECIMEN: Stomach    ESTIMATED BLOOD LOSS: <10cc.     INDICATIONS: The patient is a 60 y.o. male with a diagnosis of morbid obesity with medical sequelae. He is taken to the operating room today for Laparoscopic Sleeve Gastrectomy.     PROCEDURE: Following informed consent, the patient was properly identified, taken to the operating room, and placed in the supine position where general endotracheal anesthesia was administered. Intravenous antibiotics were administered by the anesthesiologist in the correct time interval. Sequential compression devices were employed. The abdomen was prepped and draped into a sterile field.     An optical entry bladeless  trocar was utilized and pneumoperitoneum carefully established in the usual fashion.  The bladeless 5 mm separator trocar was introduced and the 5 mm lens/camera was passed into the peritoneal cavity.  Three additional separator trocars were placed under direct vision.  A 5 mm Stephanie-type liver retractor was placed into position.  This was used to elevate the left sided segment of the liver.  It was secured to the patients right side with a robot arm.  Careful inspection revealed no untoward events with placement of the trocars.    The gastrocolic omentum was examined and dissected with the ligasure device and a point on the distal antrum was selected to begin the sleeve gastrectomy.  A 40 [place] bougie was then passed down into the antrum.  A careful inspection at the hiatus demonstrated no significant  hiatal hernia which would require repair or risk significant reflux. A echelon linear stapler with a thick-tissue cartridges, was employed to divide partway across the stomach.  With the bougie in position, the stapler was then used to march proximally along the stomach and transsection of the stomach was performed, beginning 5 cm proximal to the pylorus in the method of the sleeve gastrectomy.  The greater curvature aspect of the stomach was then dissected and the greater curvature vessels and short gastric vessels were divided with the ligasure.      The last endomechanical stapler firings were used to complete the transection of the stomach.  Hemoclips were used if any site exhibited oozing. Careful inspection of the staple line demonstrated excellent, meticulous hemostasis and a completely intact staple line with seemless tissue approximation.  Seromuscular sutures were placed using polysorb suture to further secure the staple line.  The liver exhibited mild hepatic steatosis.  The bougie was then removed.     The large endocatch bag was then used to retrieve the stomach specimen.  Tisseal fibrin glue sealant was sprayed along the entire staple line. The ports were removed under direct vision and the pneumoperitoneum was allowed to escape. The fascia of this port was closed with 0-Vicryl suture.  The port sites were then irrigated well.  The port site skin incisions were closed with interrupted 4-0 Vicryl subcuticular sutures.  Steri-Strips and Benzoin were applied beneath sterile Band-Aids.     The patient tolerated the procedure well and there were no apparent complications. All sponge, needle, and instrument counts were correct on 2 separate occasions. He was awakened, extubated, and transferred to the recovery room in satisfactory condition.       ____________________________________   Christian Clarke MD  DD: 8/4/2021  4:37 PM    CC:  [facility];

## 2021-08-04 NOTE — ANESTHESIA PROCEDURE NOTES
Airway    Date/Time: 8/4/2021 3:50 PM  Performed by: oTbi Chandra M.D.  Authorized by: Tobi Chandra M.D.     Location:  OR  Urgency:  Elective  Difficult Airway: Yes     2/2 habitus, thick neck  Indications for Airway Management:  Anesthesia      Spontaneous Ventilation: absent    Sedation Level:  Deep  Preoxygenated: Yes    Patient Position:  Sniffing  Mask Difficulty Assessment:  3 - difficult mask (inadequate, unstable or two providers) +/- NMBA  Final Airway Type:  Endotracheal airway  Final Endotracheal Airway:  ETT  Cuffed: Yes    Technique Used for Successful ETT Placement:  Video laryngoscopy    Insertion Site:  Oral  Blade Type:  Lizzy  Laryngoscope Blade/Videolaryngoscope Blade Size:  4  ETT Size (mm):  8.0  Measured from:  Teeth  ETT to Teeth (cm):  24  Placement Verified by: auscultation and capnometry    Cormack-Lehane Classification:  Grade I - full view of glottis  Number of Attempts at Approach:  1  Ventilation Between Attempts:  None  Number of Other Approaches Attempted:  0

## 2021-08-05 LAB
ALBUMIN SERPL BCP-MCNC: 3.2 G/DL (ref 3.2–4.9)
ALBUMIN/GLOB SERPL: 0.9 G/DL
ALP SERPL-CCNC: 63 U/L (ref 30–99)
ALT SERPL-CCNC: 23 U/L (ref 2–50)
ANION GAP SERPL CALC-SCNC: 12 MMOL/L (ref 7–16)
AST SERPL-CCNC: 25 U/L (ref 12–45)
BILIRUB SERPL-MCNC: 0.5 MG/DL (ref 0.1–1.5)
BUN SERPL-MCNC: 10 MG/DL (ref 8–22)
CALCIUM SERPL-MCNC: 8.4 MG/DL (ref 8.5–10.5)
CHLORIDE SERPL-SCNC: 98 MMOL/L (ref 96–112)
CO2 SERPL-SCNC: 25 MMOL/L (ref 20–33)
CREAT SERPL-MCNC: 1.08 MG/DL (ref 0.5–1.4)
ERYTHROCYTE [DISTWIDTH] IN BLOOD BY AUTOMATED COUNT: 52.5 FL (ref 35.9–50)
GLOBULIN SER CALC-MCNC: 3.6 G/DL (ref 1.9–3.5)
GLUCOSE SERPL-MCNC: 143 MG/DL (ref 65–99)
HCT VFR BLD AUTO: 46.1 % (ref 42–52)
HGB BLD-MCNC: 14 G/DL (ref 14–18)
MAGNESIUM SERPL-MCNC: 1.9 MG/DL (ref 1.5–2.5)
MCH RBC QN AUTO: 27.4 PG (ref 27–33)
MCHC RBC AUTO-ENTMCNC: 30.4 G/DL (ref 33.7–35.3)
MCV RBC AUTO: 90.2 FL (ref 81.4–97.8)
PATHOLOGY CONSULT NOTE: NORMAL
PHOSPHATE SERPL-MCNC: 3.6 MG/DL (ref 2.5–4.5)
PLATELET # BLD AUTO: 280 K/UL (ref 164–446)
PMV BLD AUTO: 8.8 FL (ref 9–12.9)
POTASSIUM SERPL-SCNC: 4.8 MMOL/L (ref 3.6–5.5)
PROT SERPL-MCNC: 6.8 G/DL (ref 6–8.2)
RBC # BLD AUTO: 5.11 M/UL (ref 4.7–6.1)
SODIUM SERPL-SCNC: 135 MMOL/L (ref 135–145)
WBC # BLD AUTO: 13.3 K/UL (ref 4.8–10.8)

## 2021-08-05 PROCEDURE — 84100 ASSAY OF PHOSPHORUS: CPT

## 2021-08-05 PROCEDURE — 700102 HCHG RX REV CODE 250 W/ 637 OVERRIDE(OP): Performed by: NURSE PRACTITIONER

## 2021-08-05 PROCEDURE — 700111 HCHG RX REV CODE 636 W/ 250 OVERRIDE (IP): Performed by: NURSE PRACTITIONER

## 2021-08-05 PROCEDURE — 99233 SBSQ HOSP IP/OBS HIGH 50: CPT | Performed by: INTERNAL MEDICINE

## 2021-08-05 PROCEDURE — A9270 NON-COVERED ITEM OR SERVICE: HCPCS | Performed by: NURSE PRACTITIONER

## 2021-08-05 PROCEDURE — 97535 SELF CARE MNGMENT TRAINING: CPT

## 2021-08-05 PROCEDURE — 97161 PT EVAL LOW COMPLEX 20 MIN: CPT

## 2021-08-05 PROCEDURE — 700111 HCHG RX REV CODE 636 W/ 250 OVERRIDE (IP): Performed by: INTERNAL MEDICINE

## 2021-08-05 PROCEDURE — 94669 MECHANICAL CHEST WALL OSCILL: CPT

## 2021-08-05 PROCEDURE — 85027 COMPLETE CBC AUTOMATED: CPT

## 2021-08-05 PROCEDURE — 83735 ASSAY OF MAGNESIUM: CPT

## 2021-08-05 PROCEDURE — 80053 COMPREHEN METABOLIC PANEL: CPT

## 2021-08-05 PROCEDURE — 770022 HCHG ROOM/CARE - ICU (200)

## 2021-08-05 PROCEDURE — 94660 CPAP INITIATION&MGMT: CPT

## 2021-08-05 RX ORDER — FUROSEMIDE 10 MG/ML
20 INJECTION INTRAMUSCULAR; INTRAVENOUS ONCE
Status: COMPLETED | OUTPATIENT
Start: 2021-08-05 | End: 2021-08-05

## 2021-08-05 RX ORDER — NALOXONE HYDROCHLORIDE 0.4 MG/ML
INJECTION, SOLUTION INTRAMUSCULAR; INTRAVENOUS; SUBCUTANEOUS PRN
Status: DISCONTINUED | OUTPATIENT
Start: 2021-08-04 | End: 2021-08-05 | Stop reason: SURG

## 2021-08-05 RX ADMIN — OXYCODONE HYDROCHLORIDE 5 MG: 5 SOLUTION ORAL at 08:43

## 2021-08-05 RX ADMIN — FAMOTIDINE 20 MG: 10 INJECTION INTRAVENOUS at 17:38

## 2021-08-05 RX ADMIN — ACETAMINOPHEN 1000 MG: 500 TABLET, FILM COATED ORAL at 12:17

## 2021-08-05 RX ADMIN — LOSARTAN POTASSIUM 100 MG: 50 TABLET, FILM COATED ORAL at 20:43

## 2021-08-05 RX ADMIN — ACETAMINOPHEN 1000 MG: 10 INJECTION, SOLUTION INTRAVENOUS at 05:10

## 2021-08-05 RX ADMIN — GABAPENTIN 300 MG: 300 CAPSULE ORAL at 17:38

## 2021-08-05 RX ADMIN — GABAPENTIN 300 MG: 300 CAPSULE ORAL at 12:17

## 2021-08-05 RX ADMIN — ENOXAPARIN SODIUM 60 MG: 60 INJECTION SUBCUTANEOUS at 08:43

## 2021-08-05 RX ADMIN — FUROSEMIDE 20 MG: 10 INJECTION, SOLUTION INTRAVENOUS at 08:43

## 2021-08-05 RX ADMIN — FAMOTIDINE 20 MG: 10 INJECTION INTRAVENOUS at 05:10

## 2021-08-05 RX ADMIN — GABAPENTIN 300 MG: 300 CAPSULE ORAL at 05:10

## 2021-08-05 RX ADMIN — ACETAMINOPHEN 1000 MG: 500 TABLET, FILM COATED ORAL at 17:39

## 2021-08-05 RX ADMIN — ENOXAPARIN SODIUM 60 MG: 60 INJECTION SUBCUTANEOUS at 20:43

## 2021-08-05 ASSESSMENT — COGNITIVE AND FUNCTIONAL STATUS - GENERAL
STANDING UP FROM CHAIR USING ARMS: A LOT
MOVING FROM LYING ON BACK TO SITTING ON SIDE OF FLAT BED: A LITTLE
MOVING TO AND FROM BED TO CHAIR: A LOT
MOBILITY SCORE: 13
WALKING IN HOSPITAL ROOM: A LOT
TURNING FROM BACK TO SIDE WHILE IN FLAT BAD: A LOT
CLIMB 3 TO 5 STEPS WITH RAILING: A LOT
SUGGESTED CMS G CODE MODIFIER MOBILITY: CL

## 2021-08-05 ASSESSMENT — ENCOUNTER SYMPTOMS
FOCAL WEAKNESS: 0
SORE THROAT: 0
COUGH: 0
SPUTUM PRODUCTION: 0
BRUISES/BLEEDS EASILY: 0
CHILLS: 0
BACK PAIN: 1
PALPITATIONS: 0
DIARRHEA: 0
ABDOMINAL PAIN: 1
FEVER: 0
NAUSEA: 0
HEADACHES: 0
HEARTBURN: 0
SHORTNESS OF BREATH: 0
NERVOUS/ANXIOUS: 0
VOMITING: 0
EYES NEGATIVE: 1

## 2021-08-05 ASSESSMENT — PAIN DESCRIPTION - PAIN TYPE
TYPE: ACUTE PAIN
TYPE: SURGICAL PAIN
TYPE: ACUTE PAIN
TYPE: SURGICAL PAIN

## 2021-08-05 ASSESSMENT — PULMONARY FUNCTION TESTS
EPAP_CMH2O: 10
EPAP_CMH2O: 10

## 2021-08-05 ASSESSMENT — GAIT ASSESSMENTS
DEVIATION: DECREASED BASE OF SUPPORT
ASSISTIVE DEVICE: FRONT WHEEL WALKER
DISTANCE (FEET): 1
GAIT LEVEL OF ASSIST: MINIMAL ASSIST

## 2021-08-05 ASSESSMENT — PAIN SCALES - GENERAL: PAIN_LEVEL: 0

## 2021-08-05 ASSESSMENT — COPD QUESTIONNAIRES
HAVE YOU SMOKED AT LEAST 100 CIGARETTES IN YOUR ENTIRE LIFE: NO/DON'T KNOW
DURING THE PAST 4 WEEKS HOW MUCH DID YOU FEEL SHORT OF BREATH: SOME OF THE TIME
COPD SCREENING SCORE: 3
DO YOU EVER COUGH UP ANY MUCUS OR PHLEGM?: NO/ONLY WITH OCCASIONAL COLDS OR INFECTIONS

## 2021-08-05 ASSESSMENT — FIBROSIS 4 INDEX: FIB4 SCORE: 1.06

## 2021-08-05 NOTE — ANESTHESIA TIME REPORT
Anesthesia Start and Stop Event Times     Date Time Event    8/4/2021 1538 Anesthesia Start     1721 Anesthesia Stop        Responsible Staff  08/04/21    Name Role Begin End    Tobi Chadnra M.D. Anesth 1538 1721        Preop Diagnosis (Free Text):  Pre-op Diagnosis     MORBID OBESITY        Preop Diagnosis (Codes):    Post op Diagnosis  Obesity with alveolar hypoventilation (HCC)      Premium Reason  B. 1st Call    Comments:

## 2021-08-05 NOTE — PROGRESS NOTES
Patient initially somnolent and unresponsive out of OR.  <90% on non-rebreather mask.  Dr. Chandra (anesthesia) at bedside throughout.  0.4mg Narcan and additional 200mg suggamadex administered by anesthesia without effect.  After 10-15 minutes in PACU spO2 ~93% on non-rebreather and increasingly responsive.  Patient placed on BiPAP 20/10 w/ FiO2 60% at ~1800.  Demonstrated ability to take off mask.  Respiratory status and wakefulness improved throughout PACU stay.  100mcg Fentanyl, 4mg Zofran, and 1mg Haldol given IV for pain / nausea.  Surgical sites x 4 CDI.  2 x L. Lateral sites oozing - reinforced per order.  VSS stable on 60% FiO2 / RR 20.  POC VBG collected in PACU.  See results tab.  Patient assessed by Dr. Gonda in PACU prior to transfer to SICU.    Patient's daughter Gina contacted with permission.  Updated on room assignment in ICU and general condition post-op.  Per daughter - she is not the primary contact as she is not local.  Patient's friend will be providing transportation upon discharge.  No number listed in contacts for this friend.  Will attempt to retrieve from patient's cell phone when more awake.    Report to [facility] S[facility].  Transported by this RN on 10L oxymask with ICU monitoring in place.

## 2021-08-05 NOTE — PROGRESS NOTES
Critical Care Progress Note    Date of admission  8/4/2021    Chief Complaint  60 y.o. male who presented 8/4/2021 with a past medical history significant for morbid obesity with associated obstructive sleep apnea, hypertension and a BMI of 61 who was admitted to the hospital today for a scheduled laparoscopic sleeve gastrectomy with Dr. Styles.  Postoperatively, patient was somnolent and hypoxic.  He was given 0.4 mg of Narcan as well as 2 doses of sugammadex without improvement.  He was started on BiPAP with improvement in his hypercapnia and hypoxia and I was consulted for critical care management.  At the time of my evaluation, patient is slowly awakening and becoming oriented.  He reports wearing CPAP at home but does not know the pressure on the machine.  He is complaining of mild nausea and has been treated with Haldol and Zofran.  There is no reported aspiration events per anesthesia. (HPI-Dr Gonda)    Hospital Course  No notes on file    Interval Problem Update  Reviewed last 24 hour events:    A&O x4  Pain controlled, denies nausea or vomiting  Took PO clear liquids well  Denies shortness of breath  Wore our PAP last night only for a few hours  Uses CPAP at home although does not remember the pressures  SR 90s  -130s  UO adequate  I/O neg 1.184L  LR 75  Work of breathing low speaking in full sentences  O2 saturation improve, patient down from 15 L to 10 L oxygen mask  ABG 7.33/55/67 last tonight  Chest x-ray with atelectasis bilaterally  IS 2000  T-max 100.5  WBC 13.3  Hemoglobin 14  History is good  Lovenox 60 twice daily  Pepcid  Cozaar      Incentive spirometry every hour while awake  Encourage incentive spirometry and add PEP if volumes are low  If we need IPV clear with surgery for since he had a gastric sleeve  Mobilize much as possible ASAP, up for all meals  Counseled patient at length about importance of mobility  Keep patient up in bed greater than 30 degrees, no laying flat  Encourage  patient to wear his CPAP while sleeping or napping  Attempt to get pressure settings from patient's machine which is apparently here somewhere?  Reviewed with RT, friend [facility] PAP unit [facility]  Physical therapy for strengthening and mobility  Follow-up chest x-ray  Heplock IVF  Laix 20 mg IV x one, re-dose as neeed        Review of Systems  Review of Systems   Constitutional: Positive for malaise/fatigue (Improved). Negative for chills and fever.   HENT: Negative for sore throat.    Eyes: Negative.    Respiratory: Negative for cough, sputum production and shortness of breath.    Cardiovascular: Positive for leg swelling (Chronic). Negative for chest pain and palpitations.   Gastrointestinal: Positive for abdominal pain. Negative for diarrhea, nausea and vomiting.   Genitourinary: Negative.    Musculoskeletal: Positive for back pain (Chronic, mild).   Neurological: Negative for focal weakness and headaches.   Endo/Heme/Allergies: Does not bruise/bleed easily.   Psychiatric/Behavioral: The patient is not nervous/anxious.         Vital Signs for last 24 hours   Temp:  [36.1 °C (97 °F)-38.1 °C (100.5 °F)] 36.9 °C (98.5 °F)  Pulse:  [] 91  Resp:  [10-32] 22  BP: (119-163)/(58-90) 135/75  SpO2:  [84 %-98 %] 95 %    Hemodynamic parameters for last 24 hours       Respiratory Information for the last 24 hours       Physical Exam   Physical Exam  Constitutional:       Appearance: He is morbidly obese. He is not ill-appearing.      Interventions: Face mask in place.   HENT:      Head: Normocephalic and atraumatic.      Mouth/Throat:      Mouth: Mucous membranes are moist.   Eyes:      General: No scleral icterus.     Extraocular Movements: Extraocular movements intact.      Pupils: Pupils are equal, round, and reactive to light.   Cardiovascular:      Rate and Rhythm: Normal rate and regular rhythm.      Comments: SR  Pulmonary:      Effort: Pulmonary effort is normal. No accessory muscle usage.      Breath sounds:  Examination of the right-lower field reveals decreased breath sounds. Examination of the left-lower field reveals decreased breath sounds. Decreased breath sounds present. No wheezing, rhonchi or rales.   Abdominal:      General: Bowel sounds are normal. There is no distension.      Palpations: Abdomen is soft. There is no mass.      Tenderness: There is abdominal tenderness. There is no right CVA tenderness, left CVA tenderness, guarding or rebound.      Hernia: No hernia is present.   Musculoskeletal:      Cervical back: Neck supple.      Right lower leg: Edema present.      Left lower leg: Edema present.   Skin:     General: Skin is warm and dry.      Coloration: Skin is not cyanotic or mottled.      Nails: There is no clubbing.      Comments: Chronic venous stasis changes bilateral lower extremities   Neurological:      General: No focal deficit present.      Mental Status: He is alert and oriented to person, place, and time. Mental status is at baseline.      GCS: GCS eye subscore is 4. GCS verbal subscore is 5. GCS motor subscore is 6.   Psychiatric:         Attention and Perception: Attention normal.         Mood and Affect: Mood normal.         Speech: Speech normal.         Behavior: Behavior normal. Behavior is cooperative.         Thought Content: Thought content normal.         Cognition and Memory: Cognition normal.         Medications  Current Facility-Administered Medications   Medication Dose Route Frequency Provider Last Rate Last Admin   • furosemide (LASIX) injection 20 mg  20 mg Intravenous Once Pa Manrique M.D.       • scopolamine (TRANSDERM-SCOP) patch 1 Patch  1 Patch Transdermal Q72HRS Christian Clarke M.D.   1 Patch at 08/04/21 1415   • losartan (COZAAR) tablet 100 mg  100 mg Oral QHS TRICE Haynes.P.R.N.       • Respiratory Therapy Consult   Nebulization Continuous RT TRICE Haynes.P.R.N.       • lactated ringers infusion (BOLUS)  500 mL Intravenous Once PRN Yung Mayo  A.P.R.N.       • enoxaparin (LOVENOX) inj 60 mg  60 mg Subcutaneous BID Yung Mayo, A.P.R.N.       • Pharmacy Consult Request ...Pain Management Review 1 Each  1 Each Other PHARMACY TO DOSE Yung Mayo, A.P.R.N.       • acetaminophen (TYLENOL) tablet 1,000 mg  1,000 mg Oral Q6HR Yung Mayo, A.P.R.N.        Followed by   • [START ON 8/9/2021] acetaminophen (TYLENOL) tablet 1,000 mg  1,000 mg Oral Q6HRS PRN Yung Mayo, A.P.R.N.       • oxyCODONE (ROXICODONE) oral solution 5 mg  5 mg Oral Q3HRS PRN Yung Mayo, A.P.R.N.        Or   • oxyCODONE (ROXICODONE) oral solution 10 mg  10 mg Oral Q3HRS PRN Yung Mayo, A.P.R.N.        Or   • HYDROmorphone (Dilaudid) injection 0.5 mg  0.5 mg Intravenous Q3HRS PRN Yung Mayo, A.P.R.N.       • ondansetron (ZOFRAN) syringe/vial injection 4 mg  4 mg Intravenous Q4HRS PRN Yung Mayo, A.P.R.N.       • haloperidol lactate (HALDOL) injection 1 mg  1 mg Intravenous Q6HRS PRN Yung Mayo, A.P.R.N.       • promethazine (PHENERGAN) suppository 25 mg  25 mg Rectal Q4HRS PRN Yung Mayo, A.P.R.N.       • diphenhydrAMINE (BENADRYL) tablet/capsule 25 mg  25 mg Oral Q6HRS PRN Yung Mayo, A.P.R.N.        Or   • diphenhydrAMINE (BENADRYL) injection 25 mg  25 mg Intravenous Q6HRS PRN Yung Mayo, A.P.R.N.       • famotidine (PEPCID) injection 20 mg  20 mg Intravenous BID Yung Mayo, A.P.R.N.   20 mg at 08/05/21 0510   • gabapentin (NEURONTIN) capsule 300 mg  300 mg Oral TID Yung Mayo, A.P.R.N.   300 mg at 08/05/21 0510   • simethicone (MYLICON) chewable tab 80 mg  80 mg Oral TID PRN TRICE Haynes.P.R.N.       • calcium carbonate (TUMS) chewable tab 500 mg  500 mg Oral Q2HRS PRN TRICE Haynes.P.R.N.       • benzocaine-menthol (CEPACOL) lozenge 1 Lozenge  1 Lozenge Mouth/Throat Q4HRS PRN Yung Mayo A.P.R.N.       • labetalol (NORMODYNE/TRANDATE) injection 10-20 mg  10-20 mg Intravenous Q4HRS PRN Jeremy M Gonda, M.D.           Fluids    Intake/Output  Summary (Last 24 hours) at 8/5/2021 0832  Last data filed at 8/5/2021 0800  Gross per 24 hour   Intake 1491.25 ml   Output 2575 ml   Net -1083.75 ml       Laboratory  Recent Labs     08/04/21  1817 08/04/21 2005 08/04/21  2142   ISTATAPH  --  7.299* 7.325*   ISTATAPCO2  --  61.7* 55.1*   ISTATAPO2  --  75 67   ISTATATCO2  --  32 30   SAMOIXG4NCT  --  93 91*   ISTATARTHCO3  --  30.3* 28.7*   ISTATARTBE  --  2 1   ISTATTEMP see below 100.5 F 37.0 C   ISTATFIO2  --  40  --    ISTATSPEC Venous Arterial Arterial   ISTATAPHTC  --  7.285* 7.325*   KXWPPTMC1OS  --  80 67         Recent Labs     08/03/21  1639 08/05/21  0411   SODIUM 137 135   POTASSIUM 4.6 4.8   CHLORIDE 99 98   CO2 24 25   BUN 11 10   CREATININE 1.15 1.08   MAGNESIUM  --  1.9   PHOSPHORUS  --  3.6   CALCIUM 9.1 8.4*     Recent Labs     08/03/21  1639 08/05/21  0411   ALTSGPT 17 23   ASTSGOT 21 25   ALKPHOSPHAT 71 63   TBILIRUBIN 0.7 0.5   GLUCOSE 103* 143*     Recent Labs     08/03/21  1639 08/05/21  0411   WBC 10.2 13.3*   NEUTSPOLYS 72.80*  --    LYMPHOCYTES 15.10*  --    MONOCYTES 9.30  --    EOSINOPHILS 1.60  --    BASOPHILS 0.60  --    ASTSGOT 21 25   ALTSGPT 17 23   ALKPHOSPHAT 71 63   TBILIRUBIN 0.7 0.5     Recent Labs     08/03/21  1639 08/05/21  0411   RBC 5.38 5.11   HEMOGLOBIN 14.8 14.0   HEMATOCRIT 47.9 46.1   PLATELETCT 318 280   PROTHROMBTM 14.5  --    INR 1.16*  --        Imaging  X-Ray:  I have personally reviewed the images and compared with prior images.    Assessment/Plan  * Morbid obesity (HCC)- (present on admission)  Assessment & Plan  Status post laparoscopic sleeve gastrectomy 8/4  I encouraged behavioral and dietary modification for weight loss  Surgery following  Routine postoperative management  As needed antiemetics, analgesics cautiously  Mobilize, PT consult to facilitate  Aggressive pulmonary toilet    Acute on chronic respiratory failure with hypoxia and hypercapnia (HCC)  Assessment & Plan  Postoperatively secondary to  obstructive sleep apnea, atelectasis & anesthetics s/p reversal agents with Narcan/sugammadex  Continue noninvasive ventilatory support titrating FiO2 to goal SPO2 greater than 88%  Chest x-ray atelectasis, repeat  RT/O2 protocol  Limit sedatives  IV Lasix x1 yielded good response, continue at lower dose daily for several days-reassess daily  Encouraged incentive spirometry/out of bed to chair when awake  Add PEP  IPV if necessary but only if cleared by surgery    Mild right heart enlargement- (present on admission)  Assessment & Plan  Strict blood pressure management  Diuresis, continue Lasix - dose x one today again 20mg  Heplock IVF - taking PO well  Optimize oxygenation and ventilation    Venous stasis dermatitis of both lower extremities- (present on admission)  Assessment & Plan  Monitor with diuresis  Negative fluid balance, good response to 1 dose of Lasix    Essential hypertension, benign- (present on admission)  Assessment & Plan  Continue patient scheduled losartan  IV as needed labetalol for goal SBP less than 160  Controlled    Obstructive sleep apnea on CPAP- (present on admission)  Assessment & Plan  Continue rescue noninvasive ventilation overnight but otherwise resume CPAP starting tomorrow night if improved  Patient encouraged to use PAP  RT to track down pressure settings, patient's friend [facility] unit [facility]       VTE:  Lovenox  Ulcer: Not Indicated  Lines: None    I have performed a physical exam and reviewed and updated ROS and Plan today (8/5/2021). In review of yesterday's note (8/4/2021), there are no changes except as documented above.     Discussed patient condition and risk of morbidity and/or mortality with RN, RT, Pharmacy, Charge nurse / hot rounds and Patient

## 2021-08-05 NOTE — RESPIRATORY CARE
Patient arrived to [facility] and placed on BiPAP. He demonstrates ability to remove mask. Bipap gel in place.    MD notified of ABG result.

## 2021-08-05 NOTE — PROGRESS NOTES
Surgical Progress Note    Author: BALDEMAR Haynes Date & Time created: 2021   8:16 AM     Interval Events:  POD#1 Laparoscopic sleeve gastrectomy  Tolerating water without nausea this AM.  Alert and oriented this morning, decreased oxygenation demands.  Bipap used last night and mask this morning.  Does not recall episode of hypoxia in pacu yesterday.  No shortness of breath this AM.  Pain controlled with oral medications.  Has not ambulated yet.  Monet catheter with good UO, clear, yellow.  Review of Systems   Constitutional: Negative for chills and fever.   Respiratory: Negative for cough and shortness of breath.    Cardiovascular: Negative for chest pain and palpitations.   Gastrointestinal: Positive for abdominal pain. Negative for diarrhea, heartburn, nausea and vomiting.   Genitourinary: Negative for dysuria.     Hemodynamics:  Temp (24hrs), Av.8 °C (98.2 °F), Min:36.1 °C (97 °F), Max:38.1 °C (100.5 °F)  Temperature: 36.9 °C (98.5 °F), Monitored Temp: 36.9 °C (98.4 °F)  Pulse  Av.6  Min: 92  Max: 111   Blood Pressure: 127/58     Respiratory:    Respiration: (!) 32, Pulse Oximetry: 94 %     Work Of Breathing / Effort: Mild;Shallow  RUL Breath Sounds: Clear, RML Breath Sounds: Diminished, RLL Breath Sounds: Diminished, JOSE MANUEL Breath Sounds: Clear, LLL Breath Sounds: Diminished  Neuro:  GCS       Fluids:    Intake/Output Summary (Last 24 hours) at 2021 0816  Last data filed at 2021 0800  Gross per 24 hour   Intake 1491.25 ml   Output 2575 ml   Net -1083.75 ml     Weight: (!) 220 kg (485 lb 14.3 oz)  Current Diet Order   Procedures   • Diet Order Diet: Clear Liquid; Miscellaneous modifications: (optional): Bariatric     Physical Exam  Vitals and nursing note reviewed.   Constitutional:       General: He is not in acute distress.     Appearance: He is obese.   HENT:      Head: Normocephalic and atraumatic.   Eyes:      Conjunctiva/sclera: Conjunctivae normal.   Cardiovascular:      Rate  and Rhythm: Normal rate and regular rhythm.   Pulmonary:      Effort: Pulmonary effort is normal.   Abdominal:      General: There is no distension.      Palpations: Abdomen is soft.      Tenderness: There is abdominal tenderness ( incisional).   Musculoskeletal:      Cervical back: Normal range of motion and neck supple.   Skin:     General: Skin is warm and dry.   Neurological:      Mental Status: He is alert.   Psychiatric:         Mood and Affect: Mood normal.         Behavior: Behavior normal.         Thought Content: Thought content normal.         Judgment: Judgment normal.       Labs:  Recent Results (from the past 24 hour(s))   POCT venous blood gas device results    Collection Time: 08/04/21  6:17 PM   Result Value Ref Range    Ph 7.392 7.310 - 7.450    Pco2 48.6 41.0 - 51.0 mmHg    Po2 93 (H) 25 - 40 mmHg    Tco2 31 20 - 33 mmol/L    SO2 97 %    Hco3 29.5 (H) 24.0 - 28.0 mmol/L    BE 3 -4 - 3 mmol/L    Body Temp see below degrees    Specimen Venous    POCT sodium device results    Collection Time: 08/04/21  6:17 PM   Result Value Ref Range    Istat Sodium 137 135 - 145 mmol/L   POCT potassium device results    Collection Time: 08/04/21  6:17 PM   Result Value Ref Range    Istat Potassium 4.5 3.6 - 5.5 mmol/L   POCT ionized CA device results    Collection Time: 08/04/21  6:17 PM   Result Value Ref Range    Istat Ionized Calcium 1.66 (H) 1.10 - 1.30 mmol/L   POCT hematocrit and hemoglobin device results    Collection Time: 08/04/21  6:17 PM   Result Value Ref Range    Istat Hematocrit 46 42 - 52 %    Istat Hemoglobin 15.6 14.0 - 18.0 g/dL   POCT arterial blood gas device results    Collection Time: 08/04/21  8:05 PM   Result Value Ref Range    Ph 7.299 (LL) 7.400 - 7.500    Pco2 61.7 (HH) 26.0 - 37.0 mmHg    Po2 75 64 - 87 mmHg    Tco2 32 20 - 33 mmol/L    S02 93 93 - 99 %    Hco3 30.3 (H) 17.0 - 25.0 mmol/L    BE 2 -4 - 3 mmol/L    Body Temp 100.5 F degrees    O2 Therapy 40 %    iPF Ratio 188     Ph Temp  Justa 7.285 (LL) 7.400 - 7.500    Pco2 Temp Co 64.6 (HH) 26.0 - 37.0 mmHg    Po2 Temp Cor 80 64 - 87 mmHg    Specimen Arterial     DelSys NIV    POCT arterial blood gas device results    Collection Time: 08/04/21  9:42 PM   Result Value Ref Range    Ph 7.325 (L) 7.400 - 7.500    Pco2 55.1 (HH) 26.0 - 37.0 mmHg    Po2 67 64 - 87 mmHg    Tco2 30 20 - 33 mmol/L    S02 91 (L) 93 - 99 %    Hco3 28.7 (H) 17.0 - 25.0 mmol/L    BE 1 -4 - 3 mmol/L    Body Temp 37.0 C degrees    Ph Temp Justa 7.325 (L) 7.400 - 7.500    Pco2 Temp Co 55.1 (HH) 26.0 - 37.0 mmHg    Po2 Temp Cor 67 64 - 87 mmHg    Specimen Arterial    CBC without Differential (blood)    Collection Time: 08/05/21  4:11 AM   Result Value Ref Range    WBC 13.3 (H) 4.8 - 10.8 K/uL    RBC 5.11 4.70 - 6.10 M/uL    Hemoglobin 14.0 14.0 - 18.0 g/dL    Hematocrit 46.1 42.0 - 52.0 %    MCV 90.2 81.4 - 97.8 fL    MCH 27.4 27.0 - 33.0 pg    MCHC 30.4 (L) 33.7 - 35.3 g/dL    RDW 52.5 (H) 35.9 - 50.0 fL    Platelet Count 280 164 - 446 K/uL    MPV 8.8 (L) 9.0 - 12.9 fL   Comp Metabolic Panel (CMP)    Collection Time: 08/05/21  4:11 AM   Result Value Ref Range    Sodium 135 135 - 145 mmol/L    Potassium 4.8 3.6 - 5.5 mmol/L    Chloride 98 96 - 112 mmol/L    Co2 25 20 - 33 mmol/L    Anion Gap 12.0 7.0 - 16.0    Glucose 143 (H) 65 - 99 mg/dL    Bun 10 8 - 22 mg/dL    Creatinine 1.08 0.50 - 1.40 mg/dL    Calcium 8.4 (L) 8.5 - 10.5 mg/dL    AST(SGOT) 25 12 - 45 U/L    ALT(SGPT) 23 2 - 50 U/L    Alkaline Phosphatase 63 30 - 99 U/L    Total Bilirubin 0.5 0.1 - 1.5 mg/dL    Albumin 3.2 3.2 - 4.9 g/dL    Total Protein 6.8 6.0 - 8.2 g/dL    Globulin 3.6 (H) 1.9 - 3.5 g/dL    A-G Ratio 0.9 g/dL   MAGNESIUM    Collection Time: 08/05/21  4:11 AM   Result Value Ref Range    Magnesium 1.9 1.5 - 2.5 mg/dL   PHOSPHORUS    Collection Time: 08/05/21  4:11 AM   Result Value Ref Range    Phosphorus 3.6 2.5 - 4.5 mg/dL   ESTIMATED GFR    Collection Time: 08/05/21  4:11 AM   Result Value Ref Range     GFR If African American >60 >60 mL/min/1.73 m 2    GFR If Non African American >60 >60 mL/min/1.73 m 2     Medical Decision Making, by Problem:  Active Hospital Problems    Diagnosis    • Acute on chronic respiratory failure with hypoxia and hypercapnia (HCC) [J96.21, J96.22]    • Essential hypertension, benign [I10]    • Mild right heart enlargement [I51.7]    • Morbid obesity (HCC) [E66.01]    • Obstructive sleep apnea on CPAP [G47.33, Z99.89]    • Venous stasis dermatitis of both lower extremities [I87.2]      Plan:  Ok per surgery to transfer to 4th floor surgical if ok with intensivist.  Pt is alert and oriented, NAD. Breathing improved now on oxygen mask. Tolerating PO water.  Incisions ok, LUQ reinforced. VS stable. Labs reviewed.  Leukocytosis, likely reactive. Encouraged ambulation and incentive spirometry when he reaches the floor.  Wean O2 as tolerated. Pt will need to stay another night for oxygenation concerns. Pt also seen and examined by Dr. Clarke.      Quality Measures:  Quality-Core Measures   Reviewed items::  Labs reviewed  DVT prophylaxis pharmacological::  Enoxaparin (Lovenox)  DVT prophylaxis - mechanical:  SCDs  Ulcer Prophylaxis::  Yes      Discussed patient condition with RN, Patient and Dr Clarke

## 2021-08-05 NOTE — THERAPY
"Physical Therapy   Initial Evaluation     Patient Name: Kulwinder Cason  Age:  60 y.o., Sex:  male  Medical Record #: [id number]  Today's Date: 8/5/2021     Precautions: Fall Risk    Assessment  Patient is 60 y.o. male with s/p  Laparoscopic Sleeve Gastrectomy, with h/o lymphedema and venous insufficiency B LEs, worse in last few months.  Additional factors influencing patient status / progress : Today, pt needed most help with getting OOB and showed decreased endurance with ambulation, becoming SOB with march in place. Pt reports struggling with lymphedema that worsened in last few months, significant B LE swelling that has improved with this time in bed. Pt lives with his 92 yo Mother who has Alzheimers. Pt reports that he needs to dc to home, as his Mom can't be alone for long (friends helping now). PT to follow to progress activity as tolerated. .      Plan    Recommend Physical Therapy 4 times per week until therapy goals are met for the following treatments:  Bed Mobility, Gait Training, Neuro Re-Education / Balance, Stair Training and Therapeutic Activities    DC Equipment Recommendations: None (already owns a FWW)  Discharge Recommendations: Recommend post-acute placement for additional physical therapy services prior to discharge home (vs home with  (if available in [place]). )       Objective       08/05/21 1047   Precautions   Precautions Fall Risk   Comments Lymphedema B LE since sept 2020, reports now better.Reports debilitating back pain on night before admit, \"back went out\", no pain now in bed.    Prior Living Situation   Prior Services Other (Comments)  (gets groceries delivered, two good friends run errands)   Housing / Facility 1 Story House   Steps Into Home 1   Steps In Home 0   Rail None   Elevator No   Equipment Owned Front-Wheel Walker;Single Point Cane   Lives with - Patient's Self Care Capacity Parents  (Mom, age 91 with early Alzheimer's)   Comments Pt reports moving in with Mom " to care for her as she can't be alone for extended periods (friends helping now). Mom is up on her own but can't help much. Two friends run errands. Pt needs to be up and walking on his own x 75 feet to function at home.    Prior Level of Functional Mobility   Bed Mobility Independent   Transfer Status Independent   Ambulation Independent   Distance Ambulation (Feet)   (short household due to lymphedema)   Assistive Devices Used Single Point Cane   Stairs Independent   Gait Analysis   Gait Level Of Assist Minimal Assist   Assistive Device Front Wheel Walker   Distance (Feet) 1  (sidesteps along EOB)   Comments stood for 5 min, able to march in place (shallow) 10 seconds x 3 reps, SOB. Took a few small sidesteps.    Bed Mobility    Supine to Sit Moderate Assist   Sit to Supine Moderate Assist   Scooting Supervised  (seated scoot)   Rolling Moderate Assist to Rt.   Comments ed done re log roll due to back pain on night before admit that now seems resolved   Functional Mobility   Sit to Stand Minimal Assist   Patient / Family Goals    Patient / Family Goal #1 home upon dc to care for Mom   Short Term Goals    Short Term Goal # 1 Pt will perform bed mobilty with flat bed, no rail (vs discuss plan to sleep recliner if too hard) with supervision in 6 visits.    Short Term Goal # 2 Pt will transfer with mod indep in 6 visits to improve functional indep.    Short Term Goal # 3 Pt will ambulate x 75 feet using FWW with supervision in 6 visits to improve functional indep.    Short Term Goal # 4 Pt will go up/down 1 step with supervision in 6 visits to access home.    Problem List    Problems Impaired Bed Mobility;Impaired Transfers;Impaired Ambulation;Impaired Balance;Decreased Activity Tolerance   Anticipated Discharge Equipment and Recommendations   DC Equipment Recommendations None  (already owns a FWW)   Discharge Recommendations Recommend post-acute placement for additional physical therapy services prior to discharge  home  (vs home with HH (if available in [place]). )

## 2021-08-05 NOTE — ASSESSMENT & PLAN NOTE
CPAP 11 cm resumed  O2 requirements 11 L during sleep last night, uses room air at home  No recent follow-up, used to follow with Dr. Franco in [place], he lives in [place]  Patient encouraged to use PAP sleep and naps  Oximetry tonight if transferred out of unit to assess O2 needs  May need follow-up sleep study  Likely to need home concentrator  We will have pulmonary/sleep team follow him when he moves out of ICU to facilitate all of above

## 2021-08-05 NOTE — PROGRESS NOTES
2 RN skin check completed with KARISSA Gallo    Areas of concern/Skin observations:  · 6 lap sites to abd-steristrips and band-aid in place CDI  · BLE with peeling and callouses-areas of pink red that are blanching  · Sacrum is pink and blanching  · Padding barrier in place to the face for BIPAP mask    Devices in use, assessed under and interventions (as appropriate) for skin protection:  · SCDs, BP cuff, SaO2 monitor, BIPAP    Interventions in place such as:   · q2 hour turns  · Keeping skin clean and dry  · Use of products such as barrier wipes/cream  · Waffle cushion while OOB: yes  · Bed Type: bariatric low air loss mattress  · Mobility: EOB

## 2021-08-05 NOTE — ASSESSMENT & PLAN NOTE
Postoperatively secondary to obstructive sleep apnea, atelectasis & anesthetics s/p reversal agents with Narcan/sugammadex  Continue noninvasive ventilatory support titrating FiO2 to goal SPO2 greater than 88%  Chest x-ray atelectasis, repeat film unchanged-encouraged pulmonary toilet  RT/O2 protocol  Limit sedatives  Continue diuresis  Encouraged incentive spirometry/out of bed to chair when awake, ambulate with walker  Continue PEP

## 2021-08-05 NOTE — CONSULTS
Critical Care Consultation    Date of consult: 8/4/2021    Referring Physician  Christian Clarke M.D.    Reason for Consultation  Acute respiratory failure post-op    History of Presenting Illness  60 y.o. male who presented 8/4/2021 with a past medical history significant for morbid obesity with associated obstructive sleep apnea, hypertension and a BMI of 61 who was admitted to the hospital today for a scheduled laparoscopic sleeve gastrectomy with Dr. Styles.  Postoperatively, patient was somnolent and hypoxic.  He was given 0.4 mg of Narcan as well as 2 doses of sugammadex without improvement.  He was started on BiPAP with improvement in his hypercapnia and hypoxia and I was consulted for critical care management.  At the time of my evaluation, patient is slowly awakening and becoming oriented.  He reports wearing CPAP at home but does not know the pressure on the machine.  He is complaining of mild nausea and has been treated with Haldol and Zofran.  There is no reported aspiration events per anesthesia.    Code Status  Full Code    Review of Systems  Review of Systems   Constitutional: Negative for chills, fever and malaise/fatigue.   HENT: Negative for sore throat.    Eyes: Negative for blurred vision.   Respiratory: Positive for shortness of breath. Negative for cough, sputum production and wheezing.    Cardiovascular: Positive for leg swelling. Negative for chest pain.   Gastrointestinal: Positive for abdominal pain and nausea. Negative for vomiting.   Genitourinary: Negative for flank pain.   Musculoskeletal: Negative for back pain.   Skin: Negative for rash.   Neurological: Negative for dizziness, speech change, focal weakness and headaches.   Endo/Heme/Allergies: Does not bruise/bleed easily.   Psychiatric/Behavioral: Negative for depression.   All other systems reviewed and are negative.      Past Medical History   has a past medical history of Arthritis, Bowel habit changes, Breath shortness (07/26/2021),  Bronchitis (2020), Hiatus hernia syndrome (2010), Hypertension, IBS (irritable bowel syndrome), Lymphedema, Obesity, Pain (07/26/2021), Pickwickian syndrome (HCC), Pneumonia, Sleep apnea (07/26/2021), and Snoring.    Surgical History   has a past surgical history that includes cholecystectomy (2010); other; other (2019); and other orthopedic surgery.    Family History  family history includes Arthritis in his father and maternal grandfather; Cancer in his maternal grandfather; Diabetes in his sister; Glaucoma in his father; Heart Disease in his father; Hyperlipidemia in his mother; Hypertension in his maternal grandmother and mother; Lung Disease in his mother; Parkinson's Disease in his father; Psychiatric Illness in his sister; Stroke in his mother.    Social History   reports that he has never smoked. He has never used smokeless tobacco. He reports previous alcohol use. He reports that he does not use drugs.    Medications  Home Medications     Reviewed by Anahi Carbajal R.N. (Registered Nurse) on 08/04/21 at 1423  Med List Status: Complete   Medication Last Dose Status   losartan (COZAAR) 100 MG Tab 8/3/2021 Active   Multiple Vitamins-Minerals (MULTIVITAMIN GUMMIES MENS PO) 7/21/2021 Active   potassium Chloride ER (K-TAB) 20 MEQ Tab CR tablet 8/1/2021 Active              Current Facility-Administered Medications   Medication Dose Route Frequency Provider Last Rate Last Admin   • scopolamine (TRANSDERM-SCOP) patch 1 Patch  1 Patch Transdermal Q72HRS Christian Clarke M.D.   1 Patch at 08/04/21 1415   • losartan (COZAAR) tablet 100 mg  100 mg Oral QHS TRICE Haynes.P.R.N.       • Respiratory Therapy Consult   Nebulization Continuous RT TRICE Haynes.P.R.N.       • lactated ringers infusion (BOLUS)  500 mL Intravenous Once PRN TRICE Haynes.P.R.N.       • potassium chloride 20 mEq in LR 1,000 mL infusion   Intravenous Continuous TRICE Haynes.P.R.N.       • [START ON 8/5/2021] enoxaparin (LOVENOX) inj 40 mg   40 mg Subcutaneous BID Yung Mayo A.P.R.N.       • Pharmacy Consult Request ...Pain Management Review 1 Each  1 Each Other PHARMACY TO DOSE TRICE Haynes.P.R.N.       • acetaminophen (OFIRMEV) injection 1,000 mg  1,000 mg Intravenous Q6HRS Yung Mayo, A.P.R.N.        Followed by   • [START ON 8/5/2021] acetaminophen (TYLENOL) tablet 1,000 mg  1,000 mg Oral Q6HR Yung Mayo, A.P.R.N.        Followed by   • [START ON 8/9/2021] acetaminophen (TYLENOL) tablet 1,000 mg  1,000 mg Oral Q6HRS PRN Yung Mayo, A.P.R.N.       • oxyCODONE (ROXICODONE) oral solution 5 mg  5 mg Oral Q3HRS PRN Yung Mayo, A.P.R.N.        Or   • oxyCODONE (ROXICODONE) oral solution 10 mg  10 mg Oral Q3HRS PRN Yung Mayo, A.P.R.N.        Or   • HYDROmorphone (Dilaudid) injection 0.5 mg  0.5 mg Intravenous Q3HRS PRN Yung Mayo, A.P.R.N.       • ondansetron (ZOFRAN) syringe/vial injection 4 mg  4 mg Intravenous Q4HRS PRN Yung Mayo, A.P.R.N.       • haloperidol lactate (HALDOL) injection 1 mg  1 mg Intravenous Q6HRS PRN Yung Mayo, A.P.R.N.       • promethazine (PHENERGAN) suppository 25 mg  25 mg Rectal Q4HRS PRN Yung Mayo, A.P.R.N.       • diphenhydrAMINE (BENADRYL) injection 12.5 mg  12.5 mg Intravenous Q6HRS PRN Yung Mayo, A.P.R.N.       • diphenhydrAMINE (BENADRYL) tablet/capsule 25 mg  25 mg Oral Q6HRS PRN Yung Mayo, A.P.R.N.        Or   • diphenhydrAMINE (BENADRYL) injection 25 mg  25 mg Intravenous Q6HRS PRN Yung Mayo, A.P.R.N.       • famotidine (PEPCID) injection 20 mg  20 mg Intravenous BID TRICE Haynes.P.R.DANIELA.       • gabapentin (NEURONTIN) capsule 300 mg  300 mg Oral TID TRICE Haynes.P.R.DANIELA.       • simethicone (MYLICON) chewable tab 80 mg  80 mg Oral TID PRN TRICE Haynes.P.R.DANIELA.       • calcium carbonate (TUMS) chewable tab 500 mg  500 mg Oral Q2HRS PRN TRICE Haynes.P.R.N.       • benzocaine-menthol (CEPACOL) lozenge 1 Lozenge  1 Lozenge Mouth/Throat Q4HRS PRN Yung Mayo,  A.P.R.N.       • NALOXONE HCL 0.4 MG/ML INJ SOLN            • SUGAMMADEX SODIUM 200 MG/2ML IV SOLN                Allergies  Allergies   Allergen Reactions   • Codeine Nausea   • Latex Rash     gloves       Vital Signs last 24 hours  Temp:  [36.1 °C (97 °F)-36.5 °C (97.7 °F)] 36.3 °C (97.4 °F)  Pulse:  [] 95  Resp:  [10-20] 18  BP: (119-163)/(65-90) 126/65  SpO2:  [84 %-98 %] 98 %    Physical Exam  Physical Exam  Vitals and nursing note reviewed.   Constitutional:       General: He is in acute distress.      Appearance: He is morbidly obese. He is ill-appearing. He is not toxic-appearing.      Interventions: Face mask in place.   HENT:      Head: Normocephalic.      Nose: Nose normal. No congestion.      Mouth/Throat:      Mouth: Mucous membranes are moist.      Pharynx: Oropharynx is clear. No oropharyngeal exudate.   Eyes:      General: No scleral icterus.     Conjunctiva/sclera: Conjunctivae normal.      Pupils: Pupils are equal, round, and reactive to light.   Neck:      Comments: enlarged circumference  Cardiovascular:      Rate and Rhythm: Regular rhythm. Tachycardia present. Occasional extrasystoles are present.     Chest Wall: PMI is displaced.      Pulses: Normal pulses.      Heart sounds: Heart sounds are distant.   Pulmonary:      Effort: Respiratory distress present. No tachypnea or accessory muscle usage.      Breath sounds: No stridor. Examination of the right-lower field reveals rales. Examination of the left-lower field reveals rales. Decreased breath sounds and rales present.      Comments: BiPAP 20/10 60%, with TVs 700-800  Abdominal:      General: Bowel sounds are normal. There is distension.      Palpations: Abdomen is soft.      Tenderness: There is abdominal tenderness. There is no guarding or rebound.      Comments: Surgical incisions are clean/dry/intact   Musculoskeletal:         General: No tenderness.      Cervical back: Neck supple. No rigidity.      Right lower le+ Pitting  Edema present.      Left lower le+ Pitting Edema present.      Comments: Chronic venous stasis changes of the skin   Skin:     General: Skin is warm and dry.      Capillary Refill: Capillary refill takes less than 2 seconds.      Coloration: Skin is not pale.   Neurological:      General: No focal deficit present.      Mental Status: He is oriented to person, place, and time and easily aroused. He is lethargic.      Cranial Nerves: No cranial nerve deficit.   Psychiatric:      Comments: Unable to assess given current clinical condition         Fluids    Intake/Output Summary (Last 24 hours) at 2021  Last data filed at 2021 1609  Gross per 24 hour   Intake 500 ml   Output --   Net 500 ml       Laboratory  Recent Results (from the past 48 hour(s))   SARS-COV RAPID Antigen SINA: Collect dry nasal swab    Collection Time: 21  4:39 PM   Result Value Ref Range    SARS-CoV-2 Source Nasal Swab     SARS-COV ANTIGEN SINA NotDetected Not-Detected   PT    Collection Time: 21  4:39 PM   Result Value Ref Range    PT 14.5 12.0 - 14.6 sec    INR 1.16 (H) 0.87 - 1.13   Comp Metabolic Panel    Collection Time: 21  4:39 PM   Result Value Ref Range    Sodium 137 135 - 145 mmol/L    Potassium 4.6 3.6 - 5.5 mmol/L    Chloride 99 96 - 112 mmol/L    Co2 24 20 - 33 mmol/L    Anion Gap 14.0 7.0 - 16.0    Glucose 103 (H) 65 - 99 mg/dL    Bun 11 8 - 22 mg/dL    Creatinine 1.15 0.50 - 1.40 mg/dL    Calcium 9.1 8.5 - 10.5 mg/dL    AST(SGOT) 21 12 - 45 U/L    ALT(SGPT) 17 2 - 50 U/L    Alkaline Phosphatase 71 30 - 99 U/L    Total Bilirubin 0.7 0.1 - 1.5 mg/dL    Albumin 3.6 3.2 - 4.9 g/dL    Total Protein 7.5 6.0 - 8.2 g/dL    Globulin 3.9 (H) 1.9 - 3.5 g/dL    A-G Ratio 0.9 g/dL   CBC WITH DIFFERENTIAL    Collection Time: 21  4:39 PM   Result Value Ref Range    WBC 10.2 4.8 - 10.8 K/uL    RBC 5.38 4.70 - 6.10 M/uL    Hemoglobin 14.8 14.0 - 18.0 g/dL    Hematocrit 47.9 42.0 - 52.0 %    MCV 89.0 81.4 - 97.8  fL    MCH 27.5 27.0 - 33.0 pg    MCHC 30.9 (L) 33.7 - 35.3 g/dL    RDW 52.9 (H) 35.9 - 50.0 fL    Platelet Count 318 164 - 446 K/uL    MPV 8.9 (L) 9.0 - 12.9 fL    Neutrophils-Polys 72.80 (H) 44.00 - 72.00 %    Lymphocytes 15.10 (L) 22.00 - 41.00 %    Monocytes 9.30 0.00 - 13.40 %    Eosinophils 1.60 0.00 - 6.90 %    Basophils 0.60 0.00 - 1.80 %    Immature Granulocytes 0.60 0.00 - 0.90 %    Nucleated RBC 0.00 /100 WBC    Neutrophils (Absolute) 7.46 (H) 1.82 - 7.42 K/uL    Lymphs (Absolute) 1.55 1.00 - 4.80 K/uL    Monos (Absolute) 0.95 (H) 0.00 - 0.85 K/uL    Eos (Absolute) 0.16 0.00 - 0.51 K/uL    Baso (Absolute) 0.06 0.00 - 0.12 K/uL    Immature Granulocytes (abs) 0.06 0.00 - 0.11 K/uL    NRBC (Absolute) 0.00 K/uL   ESTIMATED GFR    Collection Time: 08/03/21  4:39 PM   Result Value Ref Range    GFR If African American >60 >60 mL/min/1.73 m 2    GFR If Non African American >60 >60 mL/min/1.73 m 2   POCT venous blood gas device results    Collection Time: 08/04/21  6:17 PM   Result Value Ref Range    Ph 7.392 7.310 - 7.450    Pco2 48.6 41.0 - 51.0 mmHg    Po2 93 (H) 25 - 40 mmHg    Tco2 31 20 - 33 mmol/L    SO2 97 %    Hco3 29.5 (H) 24.0 - 28.0 mmol/L    BE 3 -4 - 3 mmol/L    Body Temp see below degrees    Specimen Venous    POCT sodium device results    Collection Time: 08/04/21  6:17 PM   Result Value Ref Range    Istat Sodium 137 135 - 145 mmol/L   POCT potassium device results    Collection Time: 08/04/21  6:17 PM   Result Value Ref Range    Istat Potassium 4.5 3.6 - 5.5 mmol/L   POCT ionized CA device results    Collection Time: 08/04/21  6:17 PM   Result Value Ref Range    Istat Ionized Calcium 1.66 (H) 1.10 - 1.30 mmol/L   POCT hematocrit and hemoglobin device results    Collection Time: 08/04/21  6:17 PM   Result Value Ref Range    Istat Hematocrit 46 42 - 52 %    Istat Hemoglobin 15.6 14.0 - 18.0 g/dL       Imaging  No orders to display    *Personally reviewed preop chest x-ray showing bibasilar  atelectasis with enlarged cardiac silhouette    Assessment/Plan  * Morbid obesity (HCC)- (present on admission)  Assessment & Plan  Status post laparoscopic sleeve gastrectomy 8/4  Encourage behavioral and dietary modification for weight loss  Surgery consulted  Routine postoperative management  As needed antiemetics, analgesics cautiously    Acute on chronic respiratory failure with hypoxia and hypercapnia (HCC)  Assessment & Plan  Postoperatively secondary to obstructive sleep apnea, atelectasis & anesthetics s/p reversal agents with Narcan/sugammadex  Continue noninvasive ventilatory support titrating FiO2 to goal SPO2 greater than 88%  Chest x-ray  RT/O2 protocol  Limit sedatives  IV Lasix x1  Encourage incentive spirometry/out of bed to chair when awake    Obstructive sleep apnea on CPAP- (present on admission)  Assessment & Plan  Continue rescue noninvasive ventilation overnight but otherwise resume CPAP starting tomorrow night if improved    Mild right heart enlargement- (present on admission)  Assessment & Plan  Strict blood pressure management  Diuresis  Optimize oxygenation and ventilation    Essential hypertension, benign- (present on admission)  Assessment & Plan  Continue patient scheduled losartan  IV as needed labetalol for goal SBP less than 160    Venous stasis dermatitis of both lower extremities- (present on admission)  Assessment & Plan  Monitor with diuresis      Discussed patient condition and risk of morbidity and/or mortality with RN, RT, Charge nurse / hot rounds, Patient and general surgery.    The patient remains critically ill.  Critical care time = 37 minutes in directly providing and coordinating critical care and extensive data review.  No time overlap and excludes procedures.

## 2021-08-05 NOTE — ASSESSMENT & PLAN NOTE
Status post laparoscopic sleeve gastrectomy 8/4  I encouraged behavioral and dietary modification for weight loss again  Surgery following  Routine postoperative management  As needed antiemetics, analgesics cautiously  Mobilize, PT consult to facilitate  Aggressive pulmonary toilet  Clinically improved

## 2021-08-05 NOTE — ASSESSMENT & PLAN NOTE
Strict blood pressure management-controlled  Diuresis, continue Lasix  [place]ock IVF - taking PO well  Optimize oxygenation and ventilation

## 2021-08-05 NOTE — ASSESSMENT & PLAN NOTE
Continue patient scheduled losartan  IV as needed labetalol for goal SBP less than 160  Diuresis ongoing  Controlled

## 2021-08-05 NOTE — CARE PLAN
Problem: Pain - Standard  Goal: Alleviation of pain or a reduction in pain to the patient’s comfort goal  Outcome: Progressing     Problem: Skin Integrity  Goal: Skin integrity is maintained or improved  Outcome: Progressing     The patient is Stable - Low risk of patient condition declining or worsening    Shift Goals  Clinical Goals: orient to ICU, mobilize, control pain and nausea, rest comfortably  Patient Goals: rest comfortably  Family Goals: no family present    Progress made toward(s) clinical / shift goals:  Pt up to edge of bed with PT. Pain controlled. Lap sites C/D/I. O2 sat stable on 10L oxymask.

## 2021-08-05 NOTE — CARE PLAN
Problem: Hyperinflation  Goal: Prevent or improve atelectasis  Description: 1. Instruct incentive spirometry usage  2.  Perform hyperinflation therapy as indicated  Outcome: Progressing  Flowsheets (Taken 8/5/2021 0414 by Chapo Rodriguez, ANDREINA)  Hyperinflation Protocol Goals/Outcome: Stable Vital Capacity x24 hrs and Patient Understands / uses I.S.  Hyperinflation Protocol Indications: Abdominal/Thoracic Surgeries (Open Heart)       Respiratory Update    Treatment modality: PEP  Frequency: QID    IS: 2000    Pt tolerating current treatments well with no adverse reactions.

## 2021-08-05 NOTE — ANESTHESIA POSTPROCEDURE EVALUATION
Patient: Kulwinder Cason    Procedure Summary     Date: 08/04/21 Room / Location: [place] [address] / SURGERY [facility]    Anesthesia Start: 1538 Anesthesia Stop: 1721    Procedure: GASTRECTOMY, SLEEVE, LAPAROSCOPIC. (Abdomen) Diagnosis: (MORBID OBESITY)    Surgeons: Christian Clarke M.D. Responsible Provider: Tobi Chandra M.D.    Anesthesia Type: general ASA Status: 4          Final Anesthesia Type: general  Last vitals  BP   Blood Pressure: 147/67    Temp   (!) 38.1 °C (100.5 °F)    Pulse   (!) 102   Resp   18    SpO2   94 %      Anesthesia Post Evaluation    Patient location during evaluation: PACU  Patient participation: complete - patient participated  Level of consciousness: lethargic  Pain score: 0    Airway patency: patent  Anesthetic complications: no  Cardiovascular status: hemodynamically stable  Respiratory status: acceptable and BIPAP  Hydration status: euvolemic  Comments: Transferred to ICU    PONV: none          No complications documented.     Nurse Pain Score: 0 (NPRS)

## 2021-08-05 NOTE — DIETARY
NUTRITION SERVICES: BMI - Pt with BMI >40 (=Body mass index is 62.39 kg/m².), morbid obesity. Weight loss counseling not appropriate in acute care setting. Pt s/p laparoscopic sleeve gastrectomy anticipate F/u planned with MD/RD post D/c. RECOMMEND - Referral to outpatient nutrition services for weight management, or F/u with MD/RD after D/C.

## 2021-08-06 ENCOUNTER — APPOINTMENT (OUTPATIENT)
Dept: RADIOLOGY | Facility: MEDICAL CENTER | Age: 61
DRG: 619 | End: 2021-08-06
Attending: INTERNAL MEDICINE
Payer: COMMERCIAL

## 2021-08-06 LAB
ALBUMIN SERPL BCP-MCNC: 3.1 G/DL (ref 3.2–4.9)
ALBUMIN/GLOB SERPL: 0.9 G/DL
ALP SERPL-CCNC: 55 U/L (ref 30–99)
ALT SERPL-CCNC: 20 U/L (ref 2–50)
ANION GAP SERPL CALC-SCNC: 10 MMOL/L (ref 7–16)
AST SERPL-CCNC: 19 U/L (ref 12–45)
BILIRUB SERPL-MCNC: 0.4 MG/DL (ref 0.1–1.5)
BUN SERPL-MCNC: 11 MG/DL (ref 8–22)
CALCIUM SERPL-MCNC: 8.7 MG/DL (ref 8.5–10.5)
CHLORIDE SERPL-SCNC: 99 MMOL/L (ref 96–112)
CO2 SERPL-SCNC: 29 MMOL/L (ref 20–33)
CREAT SERPL-MCNC: 0.85 MG/DL (ref 0.5–1.4)
ERYTHROCYTE [DISTWIDTH] IN BLOOD BY AUTOMATED COUNT: 54.3 FL (ref 35.9–50)
GLOBULIN SER CALC-MCNC: 3.5 G/DL (ref 1.9–3.5)
GLUCOSE BLD-MCNC: 84 MG/DL (ref 65–99)
GLUCOSE SERPL-MCNC: 91 MG/DL (ref 65–99)
HCT VFR BLD AUTO: 45.8 % (ref 42–52)
HGB BLD-MCNC: 13.9 G/DL (ref 14–18)
MAGNESIUM SERPL-MCNC: 2.2 MG/DL (ref 1.5–2.5)
MCH RBC QN AUTO: 28.2 PG (ref 27–33)
MCHC RBC AUTO-ENTMCNC: 30.3 G/DL (ref 33.7–35.3)
MCV RBC AUTO: 92.9 FL (ref 81.4–97.8)
PHOSPHATE SERPL-MCNC: 2.9 MG/DL (ref 2.5–4.5)
PLATELET # BLD AUTO: 278 K/UL (ref 164–446)
PMV BLD AUTO: 9 FL (ref 9–12.9)
POTASSIUM SERPL-SCNC: 4.3 MMOL/L (ref 3.6–5.5)
PROT SERPL-MCNC: 6.6 G/DL (ref 6–8.2)
RBC # BLD AUTO: 4.93 M/UL (ref 4.7–6.1)
SODIUM SERPL-SCNC: 138 MMOL/L (ref 135–145)
WBC # BLD AUTO: 10.1 K/UL (ref 4.8–10.8)

## 2021-08-06 PROCEDURE — A9270 NON-COVERED ITEM OR SERVICE: HCPCS | Performed by: INTERNAL MEDICINE

## 2021-08-06 PROCEDURE — 700111 HCHG RX REV CODE 636 W/ 250 OVERRIDE (IP): Performed by: INTERNAL MEDICINE

## 2021-08-06 PROCEDURE — 84100 ASSAY OF PHOSPHORUS: CPT

## 2021-08-06 PROCEDURE — 82962 GLUCOSE BLOOD TEST: CPT

## 2021-08-06 PROCEDURE — 71045 X-RAY EXAM CHEST 1 VIEW: CPT

## 2021-08-06 PROCEDURE — A9270 NON-COVERED ITEM OR SERVICE: HCPCS | Performed by: NURSE PRACTITIONER

## 2021-08-06 PROCEDURE — 80053 COMPREHEN METABOLIC PANEL: CPT

## 2021-08-06 PROCEDURE — 770001 HCHG ROOM/CARE - MED/SURG/GYN PRIV*

## 2021-08-06 PROCEDURE — 99233 SBSQ HOSP IP/OBS HIGH 50: CPT | Performed by: INTERNAL MEDICINE

## 2021-08-06 PROCEDURE — 700102 HCHG RX REV CODE 250 W/ 637 OVERRIDE(OP): Performed by: NURSE PRACTITIONER

## 2021-08-06 PROCEDURE — 85027 COMPLETE CBC AUTOMATED: CPT

## 2021-08-06 PROCEDURE — 700111 HCHG RX REV CODE 636 W/ 250 OVERRIDE (IP): Performed by: NURSE PRACTITIONER

## 2021-08-06 PROCEDURE — 94760 N-INVAS EAR/PLS OXIMETRY 1: CPT

## 2021-08-06 PROCEDURE — 94660 CPAP INITIATION&MGMT: CPT

## 2021-08-06 PROCEDURE — 99223 1ST HOSP IP/OBS HIGH 75: CPT | Performed by: HOSPITALIST

## 2021-08-06 PROCEDURE — 94669 MECHANICAL CHEST WALL OSCILL: CPT

## 2021-08-06 PROCEDURE — 83735 ASSAY OF MAGNESIUM: CPT

## 2021-08-06 PROCEDURE — 700102 HCHG RX REV CODE 250 W/ 637 OVERRIDE(OP): Performed by: INTERNAL MEDICINE

## 2021-08-06 RX ORDER — POTASSIUM CHLORIDE 20 MEQ/1
20 TABLET, EXTENDED RELEASE ORAL DAILY
Status: COMPLETED | OUTPATIENT
Start: 2021-08-06 | End: 2021-08-08

## 2021-08-06 RX ORDER — FUROSEMIDE 10 MG/ML
20 INJECTION INTRAMUSCULAR; INTRAVENOUS
Status: DISCONTINUED | OUTPATIENT
Start: 2021-08-06 | End: 2021-08-07

## 2021-08-06 RX ADMIN — GABAPENTIN 300 MG: 300 CAPSULE ORAL at 13:26

## 2021-08-06 RX ADMIN — ACETAMINOPHEN 1000 MG: 500 TABLET, FILM COATED ORAL at 13:26

## 2021-08-06 RX ADMIN — GABAPENTIN 300 MG: 300 CAPSULE ORAL at 18:22

## 2021-08-06 RX ADMIN — ENOXAPARIN SODIUM 60 MG: 60 INJECTION SUBCUTANEOUS at 09:40

## 2021-08-06 RX ADMIN — ACETAMINOPHEN 1000 MG: 500 TABLET, FILM COATED ORAL at 05:33

## 2021-08-06 RX ADMIN — FUROSEMIDE 20 MG: 10 INJECTION, SOLUTION INTRAVENOUS at 09:39

## 2021-08-06 RX ADMIN — FAMOTIDINE 20 MG: 10 INJECTION INTRAVENOUS at 05:33

## 2021-08-06 RX ADMIN — ACETAMINOPHEN 1000 MG: 500 TABLET, FILM COATED ORAL at 00:09

## 2021-08-06 RX ADMIN — ACETAMINOPHEN 1000 MG: 500 TABLET, FILM COATED ORAL at 19:27

## 2021-08-06 RX ADMIN — LOSARTAN POTASSIUM 100 MG: 50 TABLET, FILM COATED ORAL at 22:16

## 2021-08-06 RX ADMIN — POTASSIUM CHLORIDE 20 MEQ: 1500 TABLET, EXTENDED RELEASE ORAL at 09:40

## 2021-08-06 RX ADMIN — FAMOTIDINE 20 MG: 10 INJECTION INTRAVENOUS at 18:22

## 2021-08-06 RX ADMIN — ENOXAPARIN SODIUM 60 MG: 60 INJECTION SUBCUTANEOUS at 22:16

## 2021-08-06 RX ADMIN — GABAPENTIN 300 MG: 300 CAPSULE ORAL at 05:33

## 2021-08-06 ASSESSMENT — PAIN DESCRIPTION - PAIN TYPE
TYPE: ACUTE PAIN
TYPE: ACUTE PAIN;SURGICAL PAIN
TYPE: ACUTE PAIN
TYPE: ACUTE PAIN
TYPE: ACUTE PAIN;SURGICAL PAIN
TYPE: SURGICAL PAIN
TYPE: SURGICAL PAIN
TYPE: ACUTE PAIN

## 2021-08-06 ASSESSMENT — ENCOUNTER SYMPTOMS
NAUSEA: 0
COUGH: 0
DIARRHEA: 0
WHEEZING: 1
CONSTIPATION: 0
SPUTUM PRODUCTION: 0
BRUISES/BLEEDS EASILY: 0
CHILLS: 0
NERVOUS/ANXIOUS: 0
SORE THROAT: 0
HEARTBURN: 0
SHORTNESS OF BREATH: 0
FEVER: 0
FOCAL WEAKNESS: 0
PALPITATIONS: 0
VOMITING: 0
ABDOMINAL PAIN: 1
BACK PAIN: 1
HEADACHES: 0
EYES NEGATIVE: 1

## 2021-08-06 ASSESSMENT — COGNITIVE AND FUNCTIONAL STATUS - GENERAL
EATING MEALS: A LITTLE
PERSONAL GROOMING: A LITTLE
WALKING IN HOSPITAL ROOM: A LITTLE
TOILETING: A LITTLE
MOBILITY SCORE: 15
SUGGESTED CMS G CODE MODIFIER DAILY ACTIVITY: CK
CLIMB 3 TO 5 STEPS WITH RAILING: A LOT
MOVING FROM LYING ON BACK TO SITTING ON SIDE OF FLAT BED: A LITTLE
TURNING FROM BACK TO SIDE WHILE IN FLAT BAD: A LITTLE
DAILY ACTIVITIY SCORE: 18
DRESSING REGULAR UPPER BODY CLOTHING: A LITTLE
MOVING TO AND FROM BED TO CHAIR: A LOT
SUGGESTED CMS G CODE MODIFIER MOBILITY: CK
DRESSING REGULAR LOWER BODY CLOTHING: A LITTLE
STANDING UP FROM CHAIR USING ARMS: A LOT
HELP NEEDED FOR BATHING: A LITTLE

## 2021-08-06 ASSESSMENT — LIFESTYLE VARIABLES
TOTAL SCORE: 0
TOTAL SCORE: 0
HAVE YOU EVER FELT YOU SHOULD CUT DOWN ON YOUR DRINKING: NO
EVER HAD A DRINK FIRST THING IN THE MORNING TO STEADY YOUR NERVES TO GET RID OF A HANGOVER: NO
ALCOHOL_USE: NO
EVER FELT BAD OR GUILTY ABOUT YOUR DRINKING: NO
ON A TYPICAL DAY WHEN YOU DRINK ALCOHOL HOW MANY DRINKS DO YOU HAVE: 0
AVERAGE NUMBER OF DAYS PER WEEK YOU HAVE A DRINK CONTAINING ALCOHOL: 0
CONSUMPTION TOTAL: NEGATIVE
HOW MANY TIMES IN THE PAST YEAR HAVE YOU HAD 5 OR MORE DRINKS IN A DAY: 0
HAVE PEOPLE ANNOYED YOU BY CRITICIZING YOUR DRINKING: NO
TOTAL SCORE: 0

## 2021-08-06 NOTE — CARE PLAN
Problem: Ventilation Defect:  Goal: Ability to achieve and maintain unassisted ventilation or tolerate decreased levels of ventilator support  Outcome: Progressing   Patient transitioned to home CPAP; 11 cmh2o.

## 2021-08-06 NOTE — DISCHARGE PLANNING
Care Transition Team Assessment     LSW met with pt at bedside and obtained the following information used in this assessment. Verified accuracy of facesheet.     Patient lives with his 91 year old mother who has early on set of dementia. Patient is concerned about her well being while he is hospitalization. During conversation, pt's friend, Tu, called him and reports that himself and another friend named, Bernadette, will be taking turns providing care to his mother until he gets home. Patient teared up and thanked Tu for this help. LSW then inquired if he would like LSW to notify EPS for extra community support yet patient declined since his mother will have care and supervision from his friends.     Tu is able to provide transportation home either Sunday or Monday.     PCP is Peng Coles    Patient may need home o2 upon discharge. Patient has [place] and lives in [place], [place]. LSW reviewed resource guide for post acute needs however it's unclear which company could accommodate patient. LSW requested DPA, Roxann, to look into this to which she replied that Rd in [place] accepts patient's insurance and services [employer]. Patient aware he might need home 02 and is agreeable with the company.     Barriers to dc: Medical clearance and coordination of home o2.   No orders or walking o2 study have been placed or done.     Plan: Continue to assist with social/dc needs     Information Source  Orientation Level: Oriented X4  Information Given By: Patient  Who is responsible for making decisions for patient? : Patient    Readmission Evaluation  Is this a readmission?: No    Elopement Risk  Legal Hold: No  Ambulatory or Self Mobile in Wheelchair: No-Not an Elopement Risk  Elopement Risk: Not at Risk for Elopement    Interdisciplinary Discharge Planning  Lives with - Patient's Self Care Capacity: Parents (Mom, age 91 with early Alzheimer's)  Housing / Facility: [address] [facility]  Prior Services: Other  (Comments) (gets groceries delivered, two good friends run errands)    Discharge Preparedness  What is your plan after discharge?: Uncertain - pending medical team collaboration  What are your discharge supports?: Child, Other (comment) (Friends)  Prior Functional Level: Ambulatory, Independent with Activities of Daily Living, Independent with Medication Management    Functional Assesment  Prior Functional Level: Ambulatory, Independent with Activities of Daily Living, Independent with Medication Management    Finances  Financial Barriers to Discharge: No  Prescription Coverage: Yes    Vision / Hearing Impairment  Right Eye Vision: Impaired, Wears Glasses  Left Eye Vision: Impaired, Wears Glasses    Advance Directive  Advance Directive?: None  Advance Directive offered?: AD Booklet refused    Domestic Abuse  Physical Abuse or Sexual Abuse: No  Verbal Abuse or Emotional Abuse: No  Possible Abuse/Neglect Reported to:: Not Applicable    Psychological Assessment  History of Substance Abuse: None  History of Psychiatric Problems: No  Non-compliant with Treatment: No  Newly Diagnosed Illness: Yes    Discharge Risks or Barriers  Discharge risks or barriers?: Transportation, Complex medical needs  Patient risk factors: Complex medical needs, Other (comment) ([place] area)    Anticipated Discharge Information  Discharge Disposition: Discharged to home/self care (May need home o2)

## 2021-08-06 NOTE — CARE PLAN
The patient is Stable - Low risk of patient condition declining or worsening    Shift Goals  Clinical Goals: orient to ICU, mobilize, control pain and nausea, rest comfortably  Patient Goals: rest comfortably  Family Goals: no family present    Progress made toward(s) clinical / shift goals:  Pt reports tolerable pain level without PRN meds so far this shift. Denies nausea. Passing gas. CPAP HS.    Patient is not progressing towards the following goals:

## 2021-08-06 NOTE — PROGRESS NOTES
Surgical Progress Note    Author: BALDEMAR Haynes Date & Time created: 2021   7:15 AM     Interval Events:  POD #2 Laparoscopic sleeve gastrectomy    Tolerating clears without nausea/vomiting. Admits to typical incisional abdominal pain, controlled with medication. Pt is ambulating a few steps with walker yesterday in room.  Urinary catheter in place with clear yellow urine.     Review of Systems   Constitutional: Negative for chills and fever.   Respiratory: Negative for cough and shortness of breath.    Cardiovascular: Positive for leg swelling. Negative for chest pain and palpitations.   Gastrointestinal: Positive for abdominal pain ( luq, incisional). Negative for diarrhea, heartburn, nausea and vomiting.   Genitourinary: Negative for dysuria.     Hemodynamics:  Temp (24hrs), Av.2 °C (97.2 °F), Min:36.1 °C (97 °F), Max:36.4 °C (97.6 °F)  Temperature: 36.2 °C (97.1 °F), Monitored Temp: 36.6 °C (97.9 °F)  Pulse  Av.6  Min: 74  Max: 111   Blood Pressure: 143/63     Respiratory:    Respiration: 13, Pulse Oximetry: 90 %     Work Of Breathing / Effort: Mild;Shallow  RUL Breath Sounds: Clear, RML Breath Sounds: Diminished, RLL Breath Sounds: Diminished, JOSE MANUEL Breath Sounds: Clear, LLL Breath Sounds: Diminished  Neuro:  GCS       Fluids:    Intake/Output Summary (Last 24 hours) at 2021 0715  Last data filed at 2021 0600  Gross per 24 hour   Intake 980 ml   Output 5050 ml   Net -4070 ml       Current Diet Order   Procedures   • Diet Order Diet: Clear Liquid; Miscellaneous modifications: (optional): Bariatric     Physical Exam  Vitals and nursing note reviewed.   Constitutional:       General: He is not in acute distress.     Appearance: He is obese.   HENT:      Head: Normocephalic and atraumatic.   Eyes:      Conjunctiva/sclera: Conjunctivae normal.   Cardiovascular:      Rate and Rhythm: Normal rate and regular rhythm.   Pulmonary:      Effort: Pulmonary effort is normal.   Abdominal:       General: There is no distension.      Palpations: Abdomen is soft.      Tenderness: There is abdominal tenderness ( luq incisional).   Musculoskeletal:      Cervical back: Normal range of motion and neck supple.      Right lower leg: Edema present.      Left lower leg: Edema present.   Skin:     General: Skin is warm and dry.   Neurological:      Mental Status: He is alert.   Psychiatric:         Mood and Affect: Mood normal.         Behavior: Behavior normal.         Thought Content: Thought content normal.         Judgment: Judgment normal.       Labs:  Recent Results (from the past 24 hour(s))   Histology Request    Collection Time: 08/05/21  9:06 AM   Result Value Ref Range    Pathology Request Sent to Histo    CBC without Differential (blood)    Collection Time: 08/06/21  4:40 AM   Result Value Ref Range    WBC 10.1 4.8 - 10.8 K/uL    RBC 4.93 4.70 - 6.10 M/uL    Hemoglobin 13.9 (L) 14.0 - 18.0 g/dL    Hematocrit 45.8 42.0 - 52.0 %    MCV 92.9 81.4 - 97.8 fL    MCH 28.2 27.0 - 33.0 pg    MCHC 30.3 (L) 33.7 - 35.3 g/dL    RDW 54.3 (H) 35.9 - 50.0 fL    Platelet Count 278 164 - 446 K/uL    MPV 9.0 9.0 - 12.9 fL   Comp Metabolic Panel (CMP)    Collection Time: 08/06/21  4:40 AM   Result Value Ref Range    Sodium 138 135 - 145 mmol/L    Potassium 4.3 3.6 - 5.5 mmol/L    Chloride 99 96 - 112 mmol/L    Co2 29 20 - 33 mmol/L    Anion Gap 10.0 7.0 - 16.0    Glucose 91 65 - 99 mg/dL    Bun 11 8 - 22 mg/dL    Creatinine 0.85 0.50 - 1.40 mg/dL    Calcium 8.7 8.5 - 10.5 mg/dL    AST(SGOT) 19 12 - 45 U/L    ALT(SGPT) 20 2 - 50 U/L    Alkaline Phosphatase 55 30 - 99 U/L    Total Bilirubin 0.4 0.1 - 1.5 mg/dL    Albumin 3.1 (L) 3.2 - 4.9 g/dL    Total Protein 6.6 6.0 - 8.2 g/dL    Globulin 3.5 1.9 - 3.5 g/dL    A-G Ratio 0.9 g/dL   MAGNESIUM    Collection Time: 08/06/21  4:40 AM   Result Value Ref Range    Magnesium 2.2 1.5 - 2.5 mg/dL   PHOSPHORUS    Collection Time: 08/06/21  4:40 AM   Result Value Ref Range    Phosphorus  2.9 2.5 - 4.5 mg/dL   ESTIMATED GFR    Collection Time: 08/06/21  4:40 AM   Result Value Ref Range    GFR If African American >60 >60 mL/min/1.73 m 2    GFR If Non African American >60 >60 mL/min/1.73 m 2   POCT glucose device results    Collection Time: 08/06/21  5:42 AM   Result Value Ref Range    Glucose - Accu-Ck 84 65 - 99 mg/dL     Medical Decision Making, by Problem:  Active Hospital Problems    Diagnosis    • Acute on chronic respiratory failure with hypoxia and hypercapnia (HCC) [J96.21, J96.22]    • Essential hypertension, benign [I10]    • Mild right heart enlargement [I51.7]    • Morbid obesity (HCC) [E66.01]    • Obstructive sleep apnea on CPAP [G47.33, Z99.89]    • Venous stasis dermatitis of both lower extremities [I87.2]      Plan:  Pt is alert and oriented, NAD. CPAP at night with mask o2 during day. Tolerating PO.  Incisions ok. VS stable. Labs reviewed.  Leukocytosis resolved. Encouraged ambulation and incentive spirometry.  Wean O2 as tolerated. Pt ok for transfer to general surgery unit when OK with intensevist from a surgical standpoint.  Appreciate their help with management of patient.  Pt reviewed with Dr. Clarke.      Quality Measures:  Quality-Core Measures   Reviewed items::  Labs reviewed  DVT prophylaxis pharmacological::  Enoxaparin (Lovenox)  DVT prophylaxis - mechanical:  SCDs  Ulcer Prophylaxis::  Yes      Discussed patient condition with RN, Patient and Dr Clarke

## 2021-08-06 NOTE — CARE PLAN
The patient is Stable - Low risk of patient condition declining or worsening    Shift Goals  Clinical Goals: transfer to [facility]  Patient Goals: discharge  Family Goals: family not present    Progress made toward(s) clinical / shift goals:    Problem: Pain - Standard  Goal: Alleviation of pain or a reduction in pain to the patient’s comfort goal  Outcome: Progressing    Patients pain level has decreased and patient is being assessed for pain every 2 hours accordingly.     Problem: Knowledge Deficit - Standard  Goal: Patient and family/care givers will demonstrate understanding of plan of care, disease process/condition, diagnostic tests and medications  Outcome: Progressing    Patients questions answered and education provided about plan of care.

## 2021-08-06 NOTE — PROGRESS NOTES
Critical Care Progress Note    Date of admission  8/4/2021    Chief Complaint  60 y.o. male who presented 8/4/2021 with a past medical history significant for morbid obesity with associated obstructive sleep apnea, hypertension and a BMI of 61 who was admitted to the hospital today for a scheduled laparoscopic sleeve gastrectomy with Dr. Styles.  Postoperatively, patient was somnolent and hypoxic.  He was given 0.4 mg of Narcan as well as 2 doses of sugammadex without improvement.  He was started on BiPAP with improvement in his hypercapnia and hypoxia and I was consulted for critical care management.  At the time of my evaluation, patient is slowly awakening and becoming oriented.  He reports wearing CPAP at home but does not know the pressure on the machine.  He is complaining of mild nausea and has been treated with Haldol and Zofran.  There is no reported aspiration events per anesthesia. (HPI-Dr Gonda)    Hospital Course  No notes on file    Interval Problem Update  Reviewed last 24 hour events:    POD#2  Denies N/V pain controlled  No respiratory complaints  SR 80-90s  SBp 130-140s  UO great  I/Os neg4.07L  Tm 98.8  WBC 10.1  CPAP 11 cm sleep  6 lpm NC awake sats 97%  IS 2000  CXR no change ATX  Pepcid  Lovenox 60 bid, adjsuted dose PPx for weight  LUCINDA  Cozaar      Cont CPAP  MOBILIZE!!!  Strongly encouraged  IS aggressively encouraged  Continue Lasix IV, supplement potassium  Transfer to [facility]  Medicine and pulmonary/sleep to follow  May need follow-up sleep study, no f/u with sleep lab recently  Likely will need home O2 set up for discharge       YESTERDAY  A&O x4  Pain controlled, denies nausea or vomiting  Took PO clear liquids well  Denies shortness of breath  Wore our PAP last night only for a few hours  Uses CPAP at home although does not remember the pressures  SR 90s  -130s  UO adequate  I/O neg 1.184L  LR 75  Work of breathing low speaking in full sentences  O2 saturation improve, patient down  from 15 L to 10 L oxygen mask  ABG 7.33/55/67 last tonight  Chest x-ray with atelectasis bilaterally  IS 2000  T-max 100.5  WBC 13.3  Hemoglobin 14  History is good  Lovenox 60 twice daily  Pepcid  Cozaar      Incentive spirometry every hour while awake  Encourage incentive spirometry and add PEP if volumes are low  If we need IPV clear with surgery for since he had a gastric sleeve  Mobilize much as possible ASAP, up for all meals  Counseled patient at length about importance of mobility  Keep patient up in bed greater than 30 degrees, no laying flat  Encourage patient to wear his CPAP while sleeping or napping  Attempt to get pressure settings from patient's machine which is apparently here somewhere?  Reviewed with RT, friend has PAP unit at [facility]  Physical therapy for strengthening and mobility  Follow-up chest x-ray  Heplock IVF  Laix 20 mg IV x one, re-dose as neeed        Review of Systems  Review of Systems   Constitutional: Positive for malaise/fatigue (better). Negative for chills and fever.   HENT: Negative for sore throat.    Eyes: Negative.    Respiratory: Negative for cough, sputum production and shortness of breath.    Cardiovascular: Positive for leg swelling (Chronic, better). Negative for chest pain and palpitations.   Gastrointestinal: Positive for abdominal pain (better). Negative for diarrhea, nausea and vomiting.   Genitourinary: Negative.    Musculoskeletal: Positive for back pain (Chronic, mild).   Neurological: Negative for focal weakness and headaches.   Endo/Heme/Allergies: Does not bruise/bleed easily.   Psychiatric/Behavioral: The patient is not nervous/anxious.         Vital Signs for last 24 hours   Temp:  [36.1 °C (97 °F)-36.4 °C (97.6 °F)] 36.2 °C (97.1 °F)  Pulse:  [] 92  Resp:  [13-32] 13  BP: (120-168)/(58-78) 143/63  SpO2:  [90 %-99 %] 90 %    Hemodynamic parameters for last 24 hours       Respiratory Information for the last 24 hours       Physical Exam   Physical  Exam  Constitutional:       Appearance: He is morbidly obese. He is not ill-appearing.      Interventions: Face mask in place.   HENT:      Head: Normocephalic and atraumatic.      Mouth/Throat:      Mouth: Mucous membranes are moist.   Eyes:      General: No scleral icterus.     Extraocular Movements: Extraocular movements intact.      Pupils: Pupils are equal, round, and reactive to light.   Cardiovascular:      Rate and Rhythm: Normal rate and regular rhythm.      Comments: SR  Pulmonary:      Effort: Pulmonary effort is normal. No accessory muscle usage.      Breath sounds: Examination of the right-lower field reveals decreased breath sounds. Examination of the left-lower field reveals decreased breath sounds. Decreased breath sounds present. No wheezing, rhonchi or rales.   Abdominal:      General: Bowel sounds are normal. There is no distension.      Palpations: Abdomen is soft. There is no mass.      Tenderness: There is abdominal tenderness (improved). There is no right CVA tenderness, left CVA tenderness, guarding or rebound.      Hernia: No hernia is present.   Musculoskeletal:      Cervical back: Neck supple.      Right lower leg: Edema (improved) present.      Left lower leg: Edema (Improved) present.   Skin:     General: Skin is warm and dry.      Coloration: Skin is not cyanotic or mottled.      Nails: There is no clubbing.      Comments: Chronic venous stasis changes bilateral lower extremities   Neurological:      General: No focal deficit present.      Mental Status: He is alert and oriented to person, place, and time. Mental status is at baseline.      GCS: GCS eye subscore is 4. GCS verbal subscore is 5. GCS motor subscore is 6.   Psychiatric:         Attention and Perception: Attention normal.         Mood and Affect: Mood normal.         Speech: Speech normal.         Behavior: Behavior normal. Behavior is cooperative.         Thought Content: Thought content normal.         Cognition and  Memory: Cognition normal.         Medications  Current Facility-Administered Medications   Medication Dose Route Frequency Provider Last Rate Last Admin   • scopolamine (TRANSDERM-SCOP) patch 1 Patch  1 Patch Transdermal Q72HRS Christian Clarke M.D.   1 Patch at 08/04/21 1415   • losartan (COZAAR) tablet 100 mg  100 mg Oral QHS Yung Mayo, A.P.R.N.   100 mg at 08/05/21 2043   • Respiratory Therapy Consult   Nebulization Continuous RT Yung Mayo A.P.R.N.       • lactated ringers infusion (BOLUS)  500 mL Intravenous Once PRN Yung Mayo, A.P.R.N.       • enoxaparin (LOVENOX) inj 60 mg  60 mg Subcutaneous BID Yung Mayo A.P.R.N.   60 mg at 08/05/21 2043   • Pharmacy Consult Request ...Pain Management Review 1 Each  1 Each Other PHARMACY TO DOSE Yung Mayo A.P.R.N.       • acetaminophen (TYLENOL) tablet 1,000 mg  1,000 mg Oral Q6HR Yung Mayo, A.P.R.N.   1,000 mg at 08/06/21 0533    Followed by   • [START ON 8/9/2021] acetaminophen (TYLENOL) tablet 1,000 mg  1,000 mg Oral Q6HRS PRN Yung Mayo, A.P.R.N.       • oxyCODONE (ROXICODONE) oral solution 5 mg  5 mg Oral Q3HRS PRN Yung Mayo, A.P.R.N.   5 mg at 08/05/21 0843    Or   • oxyCODONE (ROXICODONE) oral solution 10 mg  10 mg Oral Q3HRS PRN Yung Mayo, A.P.R.N.        Or   • HYDROmorphone (Dilaudid) injection 0.5 mg  0.5 mg Intravenous Q3HRS PRN Yung Mayo, A.P.R.N.       • ondansetron (ZOFRAN) syringe/vial injection 4 mg  4 mg Intravenous Q4HRS PRN Yung Mayo, A.P.R.N.       • haloperidol lactate (HALDOL) injection 1 mg  1 mg Intravenous Q6HRS PRN Yung Mayo, A.P.R.N.       • promethazine (PHENERGAN) suppository 25 mg  25 mg Rectal Q4HRS PRN Yung Mayo, A.P.R.N.       • diphenhydrAMINE (BENADRYL) tablet/capsule 25 mg  25 mg Oral Q6HRS PRN Yung Mayo, A.P.R.N.        Or   • diphenhydrAMINE (BENADRYL) injection 25 mg  25 mg Intravenous Q6HRS PRN Yung Mayo, A.P.R.N.       • famotidine (PEPCID) injection 20 mg  20 mg Intravenous  BID Yung Mayo, A.P.R.N.   20 mg at 08/06/21 0533   • gabapentin (NEURONTIN) capsule 300 mg  300 mg Oral TID Yung Mayo A.P.R.N.   300 mg at 08/06/21 0533   • simethicone (MYLICON) chewable tab 80 mg  80 mg Oral TID PRN Yung Mayo, A.P.R.N.       • calcium carbonate (TUMS) chewable tab 500 mg  500 mg Oral Q2HRS PRN Yung Mayo, A.P.R.N.       • benzocaine-menthol (CEPACOL) lozenge 1 Lozenge  1 Lozenge Mouth/Throat Q4HRS PRN Yung Mayo A.P.R.N.       • labetalol (NORMODYNE/TRANDATE) injection 10-20 mg  10-20 mg Intravenous Q4HRS PRN Jeremy M Gonda, M.D.           Fluids    Intake/Output Summary (Last 24 hours) at 8/6/2021 0743  Last data filed at 8/6/2021 0600  Gross per 24 hour   Intake 980 ml   Output 5050 ml   Net -4070 ml       Laboratory  Recent Labs     08/04/21  1817 08/04/21 2005 08/04/21  2142   ISTATAPH  --  7.299* 7.325*   ISTATAPCO2  --  61.7* 55.1*   ISTATAPO2  --  75 67   ISTATATCO2  --  32 30   [id number]  --  93 91*   ISTATARTHCO3  --  30.3* 28.7*   ISTATARTBE  --  2 1   ISTATTEMP see below 100.5 F 37.0 C   ISTATFIO2  --  40  --    ISTATSPEC Venous Arterial Arterial   ISTATAPHTC  --  7.285* 7.325*   [id number]  --  80 67         Recent Labs     08/03/21  1639 08/05/21  0411 08/06/21  0440   SODIUM 137 135 138   POTASSIUM 4.6 4.8 4.3   CHLORIDE 99 98 99   CO2 24 25 29   BUN 11 10 11   CREATININE 1.15 1.08 0.85   MAGNESIUM  --  1.9 2.2   PHOSPHORUS  --  3.6 2.9   CALCIUM 9.1 8.4* 8.7     Recent Labs     08/03/21  1639 08/05/21  0411 08/06/21  0440   ALTSGPT 17 23 20   ASTSGOT 21 25 19   ALKPHOSPHAT 71 63 55   TBILIRUBIN 0.7 0.5 0.4   GLUCOSE 103* 143* 91     Recent Labs     08/03/21  1639 08/05/21  0411 08/06/21  0440   WBC 10.2 13.3* 10.1   NEUTSPOLYS 72.80*  --   --    LYMPHOCYTES 15.10*  --   --    MONOCYTES 9.30  --   --    EOSINOPHILS 1.60  --   --    BASOPHILS 0.60  --   --    ASTSGOT 21 25 19   ALTSGPT 17 23 20   ALKPHOSPHAT 71 63 55   TBILIRUBIN 0.7 0.5 0.4     Recent Labs      08/03/21  1639 08/05/21  0411 08/06/21  0440   RBC 5.38 5.11 4.93   HEMOGLOBIN 14.8 14.0 13.9*   HEMATOCRIT 47.9 46.1 45.8   PLATELETCT 318 280 278   PROTHROMBTM 14.5  --   --    INR 1.16*  --   --        Imaging  X-Ray:  I have personally reviewed the images and compared with prior images.    Assessment/Plan  * Morbid obesity (HCC)- (present on admission)  Assessment & Plan  Status post laparoscopic sleeve gastrectomy 8/4  I encouraged behavioral and dietary modification for weight loss again  Surgery following  Routine postoperative management  As needed antiemetics, analgesics cautiously  Mobilize, PT consult to facilitate  Aggressive pulmonary toilet  Clinically improved    Acute on chronic respiratory failure with hypoxia and hypercapnia (HCC)  Assessment & Plan  Postoperatively secondary to obstructive sleep apnea, atelectasis & anesthetics s/p reversal agents with Narcan/sugammadex  Continue noninvasive ventilatory support titrating FiO2 to goal SPO2 greater than 88%  Chest x-ray atelectasis, repeat film unchanged-encouraged pulmonary toilet  RT/O2 protocol  Limit sedatives  Continue diuresis  Encouraged incentive spirometry/out of bed to chair when awake, ambulate with walker  Continue PEP    Mild right heart enlargement- (present on admission)  Assessment & Plan  Strict blood pressure management-controlled  Diuresis, continue Lasix  Heplock IVF - taking PO well  Optimize oxygenation and ventilation    Venous stasis dermatitis of both lower extremities- (present on admission)  Assessment & Plan  Monitor with diuresis  Negative fluid balance, continue Lasix daily    Essential hypertension, benign- (present on admission)  Assessment & Plan  Continue patient scheduled losartan  IV as needed labetalol for goal SBP less than 160  Diuresis ongoing  Controlled    Obstructive sleep apnea on CPAP- (present on admission)  Assessment & Plan  CPAP 11 cm resumed  O2 requirements 11 L during sleep last night, uses room  air at home  No recent follow-up, used to follow with Dr. Franco in [place], he lives in [place]  Patient encouraged to use PAP sleep and naps  Oximetry tonight if transferred out of unit to assess O2 needs  May need follow-up sleep study  Likely to need home concentrator  We will have pulmonary/sleep team follow him when he moves out of ICU to facilitate all of above       VTE:  Lovenox  Ulcer: Not Indicated  Lines: None    I have performed a physical exam and reviewed and updated ROS and Plan today (8/6/2021). In review of yesterday's note (8/5/2021), there are no changes except as documented above.     Discussed patient condition and risk of morbidity and/or mortality with RN, RT, Pharmacy, Charge nurse / hot rounds and Patient     Patient appears ready for transfer to [facility].  Case reviewed with Dr. Deni Orantes and Dr. Colin García with the medicine and pulmonary/sleep teams.  RCC will sign off.

## 2021-08-06 NOTE — CONSULTS
Hospital Medicine History & Physical Note    Date of Service  8/6/2021    Primary Care Physician  BENTLEY Mahoney    Consult requested by  Critical care Dr. Manrique    Reason for Consult  Hypoxia and to take over medical care    Chief Complaint  No chief complaint on file.   Hypoxia    History of Presenting Illness  60 y.o. male who presented 8/4/2021 with a past medical history significant for morbid obesity with associated obstructive sleep apnea, hypertension and a BMI of 61 who underwent scheduled laparoscopic sleeve gastrectomy with Dr. Clrake.  Postoperatively, patient was somnolent and hypoxic.  He was given 0.4 mg of Narcan as well as 2 doses of sugammadex without improvement.  He was started on BiPAP with improvement in his hypercapnia and hypoxia.  He was also diuresed with Lasix for cor pulmonale.  Patient improved and was able to be transferred out of the ICU.  Patient needs to follow-up with pulmonary outpatient as his sleep doctor retired.  He will need home O2 and further diuresis and a repeat outpatient sleep study.  In the meantime, he will need to continue his home CPAP at 11cm of pressure as well as the addition of oxygen.    I discussed the plan of care with patient and family. And Dr. Manrique    Review of Systems  Review of Systems   Constitutional: Negative for chills and fever.   Respiratory: Positive for wheezing. Negative for cough and shortness of breath.    Cardiovascular: Positive for leg swelling. Negative for chest pain.   Gastrointestinal: Positive for abdominal pain. Negative for constipation, diarrhea, nausea and vomiting.   Genitourinary: Negative for dysuria.   Neurological: Negative for headaches.     all other systems reviewed and are negative    Past Medical History   has a past medical history of Arthritis, Bowel habit changes, Breath shortness (07/26/2021), Bronchitis (2020), Hiatus hernia syndrome (2010), Hypertension, IBS (irritable bowel syndrome), Lymphedema,  "Obesity, Pain (07/26/2021), Pickwickian syndrome (HCC), Pneumonia, Sleep apnea (07/26/2021), and Snoring.    Surgical History   has a past surgical history that includes cholecystectomy (2010); other; other (2019); other orthopedic surgery; and pr lap, james restrict proc, longitudinal gas* (8/4/2021).    Family History  family history includes Arthritis in his father and maternal grandfather; Cancer in his maternal grandfather; Diabetes in his sister; Glaucoma in his father; Heart Disease in his father; Hyperlipidemia in his mother; Hypertension in his maternal grandmother and mother; Lung Disease in his mother; Parkinson's Disease in his father; Psychiatric Illness in his sister; Stroke in his mother.    Social History   reports that he has never smoked. He has never used smokeless tobacco. He reports previous alcohol use. He reports that he does not use drugs.    Allergies  Allergies   Allergen Reactions   • Codeine Nausea   • Latex Rash     gloves       Medications  No current facility-administered medications on file prior to encounter.     Current Outpatient Medications on File Prior to Encounter   Medication Sig Dispense Refill   • potassium Chloride ER (K-TAB) 20 MEQ Tab CR tablet Take 20 mEq by mouth 1 time a day as needed.         Physical Exam  Weight/BMI: Body mass index is 62.39 kg/m².  /63   Pulse 92   Temp 36.2 °C (97.1 °F) (Temporal)   Resp (!) 25   Ht 1.88 m (6' 2\")   Wt (!) 220 kg (485 lb 14.3 oz)   SpO2 95%    Vitals:    08/06/21 0400 08/06/21 0500 08/06/21 0600 08/06/21 0817   BP: 135/67 143/63 143/63    Pulse: 83 93 92 92   Resp: (!) 23 (!) 24 13 (!) 25   Temp: 36.2 °C (97.1 °F)      TempSrc: Temporal      SpO2: 93% 96% 90% 95%   Weight:       Height:        Oxygen Therapy:  Pulse Oximetry: 95 %, O2 (LPM): 6, O2 Delivery Device: Silicone Nasal Cannula  Physical Exam  Constitutional:       General: He is not in acute distress.     Appearance: He is well-developed. He is obese. He is " ill-appearing. He is not diaphoretic.   HENT:      Head: Normocephalic and atraumatic.      Right Ear: External ear normal.      Left Ear: External ear normal.      Mouth/Throat:      Pharynx: No oropharyngeal exudate or posterior oropharyngeal erythema.   Eyes:      General:         Right eye: No discharge.         Left eye: No discharge.      Extraocular Movements: Extraocular movements intact.   Cardiovascular:      Rate and Rhythm: Normal rate.      Heart sounds: No gallop.    Pulmonary:      Effort: No respiratory distress.      Breath sounds: Rales present. No wheezing.   Abdominal:      General: There is no distension.      Tenderness: There is no abdominal tenderness. There is no guarding.   Musculoskeletal:      Right lower leg: Edema present.      Left lower leg: Edema present.   Skin:     General: Skin is warm.   Neurological:      Mental Status: He is alert and oriented to person, place, and time. Mental status is at baseline.      Motor: No weakness.   Psychiatric:         Mood and Affect: Mood normal.         Behavior: Behavior normal.         Laboratory:   Objective   Recent Results (from the past 24 hour(s))   Histology Request    Collection Time: 08/05/21  9:06 AM   Result Value Ref Range    Pathology Request Sent to Histo    CBC without Differential (blood)    Collection Time: 08/06/21  4:40 AM   Result Value Ref Range    WBC 10.1 4.8 - 10.8 K/uL    RBC 4.93 4.70 - 6.10 M/uL    Hemoglobin 13.9 (L) 14.0 - 18.0 g/dL    Hematocrit 45.8 42.0 - 52.0 %    MCV 92.9 81.4 - 97.8 fL    MCH 28.2 27.0 - 33.0 pg    MCHC 30.3 (L) 33.7 - 35.3 g/dL    RDW 54.3 (H) 35.9 - 50.0 fL    Platelet Count 278 164 - 446 K/uL    MPV 9.0 9.0 - 12.9 fL   Comp Metabolic Panel (CMP)    Collection Time: 08/06/21  4:40 AM   Result Value Ref Range    Sodium 138 135 - 145 mmol/L    Potassium 4.3 3.6 - 5.5 mmol/L    Chloride 99 96 - 112 mmol/L    Co2 29 20 - 33 mmol/L    Anion Gap 10.0 7.0 - 16.0    Glucose 91 65 - 99 mg/dL    Bun 11  8 - 22 mg/dL    Creatinine 0.85 0.50 - 1.40 mg/dL    Calcium 8.7 8.5 - 10.5 mg/dL    AST(SGOT) 19 12 - 45 U/L    ALT(SGPT) 20 2 - 50 U/L    Alkaline Phosphatase 55 30 - 99 U/L    Total Bilirubin 0.4 0.1 - 1.5 mg/dL    Albumin 3.1 (L) 3.2 - 4.9 g/dL    Total Protein 6.6 6.0 - 8.2 g/dL    Globulin 3.5 1.9 - 3.5 g/dL    A-G Ratio 0.9 g/dL   MAGNESIUM    Collection Time: 08/06/21  4:40 AM   Result Value Ref Range    Magnesium 2.2 1.5 - 2.5 mg/dL   PHOSPHORUS    Collection Time: 08/06/21  4:40 AM   Result Value Ref Range    Phosphorus 2.9 2.5 - 4.5 mg/dL   ESTIMATED GFR    Collection Time: 08/06/21  4:40 AM   Result Value Ref Range    GFR If African American >60 >60 mL/min/1.73 m 2    GFR If Non African American >60 >60 mL/min/1.73 m 2   POCT glucose device results    Collection Time: 08/06/21  5:42 AM   Result Value Ref Range    Glucose - Accu-Ck 84 65 - 99 mg/dL       (click the triangle to expand results)    Imaging:  DX-CHEST-PORTABLE (1 VIEW)   Final Result      1.  Bibasilar, RIGHT upper lobe and perihilar underinflation atelectasis which could obscure an additional process. This is unchanged.   2.  Persistently enlarged cardiac silhouette      DX-CHEST-LIMITED (1 VIEW)   Final Result      1.  Increase in bilateral atelectasis      2.  Enlarged cardiac silhouette        Assessment/Plan:  * Morbid obesity (HCC)- (present on admission)  Assessment & Plan  Status post laparoscopic sleeve gastrectomy 8/4  Surgery following  Routine postoperative management    Acute on chronic respiratory failure with hypoxia and hypercapnia (HCC)- (present on admission)  Assessment & Plan  Postoperatively secondary to obstructive sleep apnea, atelectasis & anesthetics s/p reversal agents with Narcan/sugammadex  Continue noninvasive ventilatory support titrating FiO2 to goal SPO2 greater than 88%  Chest x-ray atelectasis, repeat film unchanged-encourage pulmonary toilet  Encouraged incentive spirometry/out of bed to chair when awake,  ambulate with walker  Continue PEP  Diuresing as above    Mild right heart enlargement- (present on admission)  Assessment & Plan  Strict blood pressure management-controlled  Diuresis, continue Lasix  DC IVF      Venous stasis dermatitis of both lower extremities- (present on admission)  Assessment & Plan  Monitor with diuresis  Negative fluid balance, continue Lasix daily    Essential hypertension, benign- (present on admission)  Assessment & Plan  Continue patient scheduled losartan  IV as needed labetalol for goal SBP less than 160  Continue diuresing with IV Lasix    Obstructive sleep apnea on CPAP- (present on admission)  Assessment & Plan  Requiring CPAP 11 cm (home pressure) with 11L O2 (normally on room air at home)  Likely to needs home concentrator  Patient will require repeat outpatient sleep study and new doctor as his previous sleep DrIsatu Franco retired  If unable to get sleep study approved, may need continuous nocturnal oximetry with histogram to give his [employer] insurance company so that they will authorize.  Another option may be to write him a sleep concentrator for 1 month at a few $100 and then get him into a sleep lab.  Pulmonary will follow patient out of the ICU to help facilitate the above      VTE prophylaxis:  enoxaparin

## 2021-08-06 NOTE — HOSPITAL COURSE
60 y.o. male who presented 8/4/2021 with a past medical history significant for morbid obesity with associated obstructive sleep apnea, hypertension and a BMI of 61 who underwent scheduled laparoscopic sleeve gastrectomy with Dr. Clarke.  Postoperatively, patient was somnolent and hypoxic.  He was given 0.4 mg of Narcan as well as 2 doses of sugammadex without improvement.  He was started on BiPAP with improvement in his hypercapnia and hypoxia.  He was also diuresed with Lasix for cor pulmonale.  Patient improved and was able to be transferred out of the ICU.  Patient needs to follow-up with pulmonary outpatient as his sleep doctor retired.  He will need home O2 and further diuresis and a repeat outpatient sleep study.  In the meantime, he will need to continue his home CPAP at 11cm of pressure as well as the addition of oxygen.

## 2021-08-06 NOTE — ASSESSMENT & PLAN NOTE
Probable Obesity Hypoventilation Syndrome  Probable Pulmonary hypertension  RT protocol, Incentive spirometry  PT and OT  Lasix on discharge

## 2021-08-06 NOTE — CONSULTS
"Pulmonary Consultation Note    Patient ID:   Name:             Kulwinder Cason   YOB: 1960  Age:                 60 y.o.  male   MRN:               [id number]  Referring Provider: Dr. Manrique                                                  History of Present Illness:   Mr. Cason is a 60 YOM with PMHx of morbid obesity (BMI > 61), RUSS, HTN who came in to the hospital for scheduled laparoscopic sleeve gastrectomy on 8/4. Post-operatively, pt was somnolent and hypoxic and required BiPAP, which led to improvement in hypercapnia and hypoxia. Pt slowly become more alert and oriented. Currently using CPAP (also uses at home, but unclear on settings), requiring 11 cm. Pt is not normally on home oxygen.    Pulmonology was consulted for assistance with hypoxia and weaning of oxygen.      Review of Systems: Review of Systems   Constitutional: Positive for malaise/fatigue (better). Negative for chills and fever.   HENT: Negative for sore throat.    Eyes: Negative.    Respiratory: Negative for cough, sputum production and shortness of breath.    Cardiovascular: Positive for leg swelling (chronic, better). Negative for chest pain and palpitations.   Gastrointestinal: Positive for abdominal pain (better). Negative for diarrhea, nausea and vomiting.   Genitourinary: Negative.    Musculoskeletal: Positive for back pain (Chronic, mild).   Neurological: Negative for focal weakness and headaches.   Endo/Heme/Allergies: Does not bruise/bleed easily.   Psychiatric/Behavioral: The patient is not nervous/anxious.        Past Medical History:   Past Medical History:   Diagnosis Date   • Arthritis    • Bowel habit changes    • Breath shortness 07/26/2021    Pt states \"related to weight and size\".   • Bronchitis 2020   • Hiatus hernia syndrome 2010    hernia repair-umbilical   • Hypertension    • IBS (irritable bowel syndrome)    • Lymphedema     Legs, abdomin and arms.  Uses a pump and compression hose.   • " "Obesity    • Pain 07/26/2021    Lymphadema pain. Neuropathy.   • Pickwickian syndrome (HCC)    • Pneumonia     In the past.   • Sleep apnea 07/26/2021    Uses CPAP.   • Snoring     RUSS-CPAP @ NOC       Past Surgical History:   Past Surgical History:   Procedure Laterality Date   • PB LAP, BHAVIK RESTRICT PROC, LONGITUDINAL GAS*  8/4/2021    Procedure: GASTRECTOMY, SLEEVE, LAPAROSCOPIC.;  Surgeon: Christian Clarke M.D.;  Location: SURGERY [facility];  Service: General   • OTHER  2019    Colonoscopy   • CHOLECYSTECTOMY  2010    With hernia repair.   • OTHER      Right foot - tendonitis.   • OTHER ORTHOPEDIC SURGERY      Right meniscus.       Family History: family history includes Arthritis in his father and maternal grandfather; Cancer in his maternal grandfather; Diabetes in his sister; Glaucoma in his father; Heart Disease in his father; Hyperlipidemia in his mother; Hypertension in his maternal grandmother and mother; Lung Disease in his mother; Parkinson's Disease in his father; Psychiatric Illness in his sister; Stroke in his mother.    Social History:  reports that he has never smoked. He has never used smokeless tobacco. He reports previous alcohol use. He reports that he does not use drugs.    Objective:   Vitals/ General Appearance:   Weight/BMI: Body mass index is 62.39 kg/m².  /86   Pulse 94   Temp 36.3 °C (97.4 °F) (Temporal)   Resp (!) 22   Ht 1.88 m (6' 2\")   Wt (!) 220 kg (485 lb 14.3 oz)   SpO2 94%   Vitals:    08/06/21 0817 08/06/21 1000 08/06/21 1108 08/06/21 1115   BP:  152/86     Pulse: 92 84 94    Resp: (!) 25 (!) 21 20 (!) 22   Temp:       TempSrc:       SpO2: 95% 95% 95% 94%   Weight:       Height:         Oxygen Therapy:  Pulse Oximetry: 94 %, O2 (LPM): 2, O2 Delivery Device: Oxymask    Constitutional:   NAD, well-appearing, obese  HENNT:  NC/AT, EOMI, nl conjunctiva, MMM  Eyes:  EOMI, Conjunctiva normal, No discharge.  Neck:  Normal range of motion, No cervical tenderness,  no " JVD.  Cardiovascular:  RRR, no MRG, nl S1, S2  Lungs:  Decreased bibasilar sounds, on 6L O2  Abdomen: Mild abdominal tenderness, soft, ND, nl bowel sounds  Skin: Chronic venous stasis changes in BLE, mild BLE edema  Neurologic: A&O x3, CN II-XII grossly intact  Psychiatric: Affect normal, Judgment normal, Mood normal.    Labs:  Recent Labs     08/03/21  1639 08/05/21  0411 08/06/21  0440   WBC 10.2 13.3* 10.1   RBC 5.38 5.11 4.93   HEMOGLOBIN 14.8 14.0 13.9*   HEMATOCRIT 47.9 46.1 45.8   MCV 89.0 90.2 92.9   MCH 27.5 27.4 28.2   MCHC 30.9* 30.4* 30.3*   RDW 52.9* 52.5* 54.3*   PLATELETCT 318 280 278   MPV 8.9* 8.8* 9.0     Recent Labs     08/03/21  1639   INR 1.16*             Recent Labs     08/03/21  1639 08/05/21  0411 08/06/21  0440   SODIUM 137 135 138   POTASSIUM 4.6 4.8 4.3   CHLORIDE 99 98 99   CO2 24 25 29   GLUCOSE 103* 143* 91   BUN 11 10 11     Recent Labs     08/03/21  1639 08/05/21  0411 08/06/21  0440   SODIUM 137 135 138   POTASSIUM 4.6 4.8 4.3   CHLORIDE 99 98 99   CO2 24 25 29   BUN 11 10 11   CREATININE 1.15 1.08 0.85   MAGNESIUM  --  1.9 2.2   PHOSPHORUS  --  3.6 2.9   CALCIUM 9.1 8.4* 8.7     No results found for this or any previous visit.      Imaging:   DX-CHEST-PORTABLE (1 VIEW)   Final Result      1.  Bibasilar, RIGHT upper lobe and perihilar underinflation atelectasis which could obscure an additional process. This is unchanged.   2.  Persistently enlarged cardiac silhouette      DX-CHEST-LIMITED (1 VIEW)   Final Result      1.  Increase in bilateral atelectasis      2.  Enlarged cardiac silhouette          Hospital Medications:    Current Facility-Administered Medications:   •  furosemide (LASIX) injection 20 mg, 20 mg, Intravenous, Q DAY, Pa Manrique M.D., 20 mg at 08/06/21 0939  •  potassium chloride SA (Kdur) tablet 20 mEq, 20 mEq, Oral, DAILY, Pa Manrique M.D., 20 mEq at 08/06/21 0940  •  scopolamine (TRANSDERM-SCOP) patch 1 Patch, 1 Patch, Transdermal, Q72HRS, Christian SANDOVAL  MYRIAM Clarke, 1 Patch at 08/04/21 1415  •  losartan (COZAAR) tablet 100 mg, 100 mg, Oral, QHS, TRICE Haynes.P.R.N., 100 mg at 08/05/21 2043  •  Respiratory Therapy Consult, , Nebulization, Continuous RT, TRICE Haynes.P.R.N.  •  lactated ringers infusion (BOLUS), 500 mL, Intravenous, Once PRN, TRICE Haynes.P.R.N.  •  enoxaparin (LOVENOX) inj 60 mg, 60 mg, Subcutaneous, BID, TRICE Haynes.P.R.N., 60 mg at 08/06/21 0940  •  Pharmacy Consult Request ...Pain Management Review 1 Each, 1 Each, Other, PHARMACY TO DOSE, TRICE Haynes.P.R.DANIELA.  •  [COMPLETED] acetaminophen (OFIRMEV) injection 1,000 mg, 1,000 mg, Intravenous, Q6HRS, 1,000 mg at 08/05/21 0510 **FOLLOWED BY** acetaminophen (TYLENOL) tablet 1,000 mg, 1,000 mg, Oral, Q6HR, 1,000 mg at 08/06/21 0533 **FOLLOWED BY** [START ON 8/9/2021] acetaminophen (TYLENOL) tablet 1,000 mg, 1,000 mg, Oral, Q6HRS PRN, TRICE Haynes.P.R.N.  •  oxyCODONE (ROXICODONE) oral solution 5 mg, 5 mg, Oral, Q3HRS PRN, 5 mg at 08/05/21 0843 **OR** oxyCODONE (ROXICODONE) oral solution 10 mg, 10 mg, Oral, Q3HRS PRN **OR** HYDROmorphone (Dilaudid) injection 0.5 mg, 0.5 mg, Intravenous, Q3HRS PRN, TRICE Haynes.P.R.N.  •  ondansetron (ZOFRAN) syringe/vial injection 4 mg, 4 mg, Intravenous, Q4HRS PRN, TRICE Haynes.P.R.N.  •  haloperidol lactate (HALDOL) injection 1 mg, 1 mg, Intravenous, Q6HRS PRN, Yung Mayo A.P.R.N.  •  promethazine (PHENERGAN) suppository 25 mg, 25 mg, Rectal, Q4HRS PRN, Yung Mayo A.P.R.N.  •  diphenhydrAMINE (BENADRYL) tablet/capsule 25 mg, 25 mg, Oral, Q6HRS PRN **OR** diphenhydrAMINE (BENADRYL) injection 25 mg, 25 mg, Intravenous, Q6HRS PRN, Yung Mayo A.P.R.N.  •  famotidine (PEPCID) injection 20 mg, 20 mg, Intravenous, BID, Yung Mayo, A.P.R.N., 20 mg at 08/06/21 0533  •  gabapentin (NEURONTIN) capsule 300 mg, 300 mg, Oral, TID, Yung Mayo, A.P.R.N., 300 mg at 08/06/21 0533  •  simethicone (MYLICON) chewable tab 80 mg, 80 mg,  Oral, TID PRN, Yung Mayo, A.P.R.N.  •  calcium carbonate (TUMS) chewable tab 500 mg, 500 mg, Oral, Q2HRS PRN, Yung Mayo A.P.R.N.  •  benzocaine-menthol (CEPACOL) lozenge 1 Lozenge, 1 Lozenge, Mouth/Throat, Q4HRS PRN, Yung Mayo A.P.R.N.  •  labetalol (NORMODYNE/TRANDATE) injection 10-20 mg, 10-20 mg, Intravenous, Q4HRS PRN, Jeremy M Gonda, M.D.    Current Outpatient Medications:  Medications Prior to Admission   Medication Sig Dispense Refill Last Dose   • losartan (COZAAR) 100 MG Tab Take 100 mg by mouth at bedtime.   8/3/2021 at 2000   • Multiple Vitamins-Minerals (MULTIVITAMIN GUMMIES MENS PO) Take 2 Tablets by mouth every day.   7/21/2021 at am   • potassium Chloride ER (K-TAB) 20 MEQ Tab CR tablet Take 20 mEq by mouth 1 time a day as needed.   8/1/2021 at Unknown time       Medication Allergy:  Allergies   Allergen Reactions   • Codeine Nausea   • Latex Rash     gloves       Assessment and Plan:  #Acute hypoxia  #RUSS  #Type II pulmonary hypertension  #S/p sleeve gastrectomy  - Currently on 6L, not on oxygen at home - likely temporary and surgery-related  - Symptoms are likely related to RV heart issues from type II pHTN  - Slowly wean O2 down  - Recommend consulting PT/OT, encourage ambulation, use incentive spirometry, chest physiotherapy  - Continue diuresis - currently on IV Lasix TID  - Will likely need repeat sleep study as outpatient

## 2021-08-07 PROBLEM — Z98.84 S/P LAPAROSCOPIC SLEEVE GASTRECTOMY: Status: ACTIVE | Noted: 2021-08-07

## 2021-08-07 LAB
ALBUMIN SERPL BCP-MCNC: 3.2 G/DL (ref 3.2–4.9)
ALBUMIN/GLOB SERPL: 0.9 G/DL
ALP SERPL-CCNC: 58 U/L (ref 30–99)
ALT SERPL-CCNC: 19 U/L (ref 2–50)
ANION GAP SERPL CALC-SCNC: 9 MMOL/L (ref 7–16)
AST SERPL-CCNC: 20 U/L (ref 12–45)
BILIRUB SERPL-MCNC: 0.5 MG/DL (ref 0.1–1.5)
BUN SERPL-MCNC: 14 MG/DL (ref 8–22)
CALCIUM SERPL-MCNC: 8.8 MG/DL (ref 8.5–10.5)
CHLORIDE SERPL-SCNC: 96 MMOL/L (ref 96–112)
CO2 SERPL-SCNC: 32 MMOL/L (ref 20–33)
CREAT SERPL-MCNC: 1.03 MG/DL (ref 0.5–1.4)
ERYTHROCYTE [DISTWIDTH] IN BLOOD BY AUTOMATED COUNT: 54.4 FL (ref 35.9–50)
GLOBULIN SER CALC-MCNC: 3.7 G/DL (ref 1.9–3.5)
GLUCOSE SERPL-MCNC: 87 MG/DL (ref 65–99)
HCT VFR BLD AUTO: 46.8 % (ref 42–52)
HGB BLD-MCNC: 14 G/DL (ref 14–18)
MCH RBC QN AUTO: 27.5 PG (ref 27–33)
MCHC RBC AUTO-ENTMCNC: 29.9 G/DL (ref 33.7–35.3)
MCV RBC AUTO: 91.9 FL (ref 81.4–97.8)
PLATELET # BLD AUTO: 279 K/UL (ref 164–446)
PMV BLD AUTO: 9 FL (ref 9–12.9)
POTASSIUM SERPL-SCNC: 4.2 MMOL/L (ref 3.6–5.5)
PROT SERPL-MCNC: 6.9 G/DL (ref 6–8.2)
RBC # BLD AUTO: 5.09 M/UL (ref 4.7–6.1)
SODIUM SERPL-SCNC: 137 MMOL/L (ref 135–145)
WBC # BLD AUTO: 8.2 K/UL (ref 4.8–10.8)

## 2021-08-07 PROCEDURE — A9270 NON-COVERED ITEM OR SERVICE: HCPCS | Performed by: INTERNAL MEDICINE

## 2021-08-07 PROCEDURE — 700111 HCHG RX REV CODE 636 W/ 250 OVERRIDE (IP): Performed by: INTERNAL MEDICINE

## 2021-08-07 PROCEDURE — 700111 HCHG RX REV CODE 636 W/ 250 OVERRIDE (IP): Performed by: STUDENT IN AN ORGANIZED HEALTH CARE EDUCATION/TRAINING PROGRAM

## 2021-08-07 PROCEDURE — A9270 NON-COVERED ITEM OR SERVICE: HCPCS | Performed by: NURSE PRACTITIONER

## 2021-08-07 PROCEDURE — 94660 CPAP INITIATION&MGMT: CPT

## 2021-08-07 PROCEDURE — 99233 SBSQ HOSP IP/OBS HIGH 50: CPT | Mod: GC | Performed by: INTERNAL MEDICINE

## 2021-08-07 PROCEDURE — 302135 SEQUENTIAL COMPRESSION MACHINE: Performed by: HOSPITALIST

## 2021-08-07 PROCEDURE — 85027 COMPLETE CBC AUTOMATED: CPT

## 2021-08-07 PROCEDURE — 700102 HCHG RX REV CODE 250 W/ 637 OVERRIDE(OP): Performed by: INTERNAL MEDICINE

## 2021-08-07 PROCEDURE — 36415 COLL VENOUS BLD VENIPUNCTURE: CPT

## 2021-08-07 PROCEDURE — 80053 COMPREHEN METABOLIC PANEL: CPT

## 2021-08-07 PROCEDURE — 700102 HCHG RX REV CODE 250 W/ 637 OVERRIDE(OP): Performed by: NURSE PRACTITIONER

## 2021-08-07 PROCEDURE — 700111 HCHG RX REV CODE 636 W/ 250 OVERRIDE (IP): Performed by: NURSE PRACTITIONER

## 2021-08-07 PROCEDURE — 99233 SBSQ HOSP IP/OBS HIGH 50: CPT | Performed by: FAMILY MEDICINE

## 2021-08-07 PROCEDURE — 770001 HCHG ROOM/CARE - MED/SURG/GYN PRIV*

## 2021-08-07 RX ORDER — FUROSEMIDE 10 MG/ML
40 INJECTION INTRAMUSCULAR; INTRAVENOUS
Status: DISCONTINUED | OUTPATIENT
Start: 2021-08-07 | End: 2021-08-09 | Stop reason: HOSPADM

## 2021-08-07 RX ADMIN — FUROSEMIDE 20 MG: 10 INJECTION, SOLUTION INTRAVENOUS at 04:51

## 2021-08-07 RX ADMIN — FAMOTIDINE 20 MG: 10 INJECTION INTRAVENOUS at 04:52

## 2021-08-07 RX ADMIN — GABAPENTIN 300 MG: 300 CAPSULE ORAL at 11:44

## 2021-08-07 RX ADMIN — ENOXAPARIN SODIUM 60 MG: 60 INJECTION SUBCUTANEOUS at 11:01

## 2021-08-07 RX ADMIN — ACETAMINOPHEN 1000 MG: 500 TABLET, FILM COATED ORAL at 00:47

## 2021-08-07 RX ADMIN — GABAPENTIN 300 MG: 300 CAPSULE ORAL at 04:51

## 2021-08-07 RX ADMIN — LOSARTAN POTASSIUM 100 MG: 50 TABLET, FILM COATED ORAL at 22:11

## 2021-08-07 RX ADMIN — GABAPENTIN 300 MG: 300 CAPSULE ORAL at 17:19

## 2021-08-07 RX ADMIN — FAMOTIDINE 20 MG: 10 INJECTION INTRAVENOUS at 18:52

## 2021-08-07 RX ADMIN — FUROSEMIDE 40 MG: 10 INJECTION, SOLUTION INTRAMUSCULAR; INTRAVENOUS at 17:19

## 2021-08-07 RX ADMIN — ACETAMINOPHEN 1000 MG: 500 TABLET, FILM COATED ORAL at 04:51

## 2021-08-07 RX ADMIN — POTASSIUM CHLORIDE 20 MEQ: 1500 TABLET, EXTENDED RELEASE ORAL at 04:51

## 2021-08-07 RX ADMIN — ENOXAPARIN SODIUM 60 MG: 60 INJECTION SUBCUTANEOUS at 22:11

## 2021-08-07 RX ADMIN — OXYCODONE HYDROCHLORIDE 5 MG: 5 SOLUTION ORAL at 00:47

## 2021-08-07 RX ADMIN — ACETAMINOPHEN 1000 MG: 500 TABLET, FILM COATED ORAL at 23:54

## 2021-08-07 RX ADMIN — ACETAMINOPHEN 1000 MG: 500 TABLET, FILM COATED ORAL at 18:10

## 2021-08-07 ASSESSMENT — ENCOUNTER SYMPTOMS
NERVOUS/ANXIOUS: 0
ABDOMINAL PAIN: 1
CHILLS: 0
BLURRED VISION: 0
WHEEZING: 0
COUGH: 0
FEVER: 0
DIZZINESS: 0
BACK PAIN: 0
DIAPHORESIS: 0
NAUSEA: 0
FOCAL WEAKNESS: 0
WEAKNESS: 0
SORE THROAT: 0
BACK PAIN: 1
EYES NEGATIVE: 1
BRUISES/BLEEDS EASILY: 0
SENSORY CHANGE: 0
PALPITATIONS: 0
DIARRHEA: 0
MYALGIAS: 0
SHORTNESS OF BREATH: 0
HEARTBURN: 0
FLANK PAIN: 0
HEADACHES: 0
NECK PAIN: 0
VOMITING: 0
SPUTUM PRODUCTION: 0
SPEECH CHANGE: 0

## 2021-08-07 ASSESSMENT — PAIN DESCRIPTION - PAIN TYPE
TYPE: ACUTE PAIN;SURGICAL PAIN

## 2021-08-07 NOTE — CARE PLAN
The patient is Stable - Low risk of patient condition declining or worsening    Shift Goals  Clinical Goals: Pain control, mobilize, Cpap, sleep  Patient Goals: Sleep  Family Goals: family not present    Progress made toward(s) clinical / shift goals:  Medicated for pain; see MAR. Patient assisted to/from bathroom; steady/unsteady gait. Bariatric bed with trapeze bar to be requested in AM. CPAP set up by RT. Patient sleeping at present time.     Patient is not progressing towards the following goals:NA.

## 2021-08-07 NOTE — PROGRESS NOTES
[facility] Medicine Daily Progress Note    Date of Service  8/7/2021    Chief Complaint  Kulwinder Cason is a 60 y.o. male admitted 8/4/2021 for laparoscopic sleeve gastrectomy.    Hospital Course  Admitted for laparoscopic sleeve gastrectomy.  Known history of morbid obesity, obstructive sleep apnea.  Postoperatively he had acute respiratory failure with hypoxia and hypercapnia, attributed to probable obesity hypoventilation syndrome, sleep apnea, effects of anesthesia.  He received 2 doses of sugammadex, a dose of Narcan and he was placed on BiPAP.  He was started on IV Lasix for diuresis.  He was admitted to the ICU.  Pulmonary medicine has been consulted on the case.     Interval Problem Update  Respiratory failure - O2 3 lpm NC  RUSS - CPAP with 11 cm pressure and O2 supplementation  HTN - sbp 120-140  Sleeve Gastrectomy - pain controlled, tolerating clear liquids    I have personally seen and examined the patient at bedside. I discussed the plan of care with patient and bedside RN.    Consultants/Specialty  general surgery and pulmonary    Code Status  Full Code    Disposition  Patient is not medically cleared.   Anticipate discharge to to home with close outpatient follow-up.  I have placed the appropriate orders for post-discharge needs.    Review of Systems  Review of Systems   Constitutional: Negative for chills, diaphoresis, fever and malaise/fatigue.   HENT: Negative for congestion, hearing loss and sore throat.    Eyes: Negative for blurred vision.   Respiratory: Negative for cough, shortness of breath and wheezing.    Cardiovascular: Positive for leg swelling. Negative for chest pain and palpitations.   Gastrointestinal: Positive for abdominal pain. Negative for diarrhea, heartburn, nausea and vomiting.   Genitourinary: Negative for dysuria, flank pain and hematuria.   Musculoskeletal: Negative for back pain, joint pain, myalgias and neck pain.   Skin: Negative for rash.   Neurological: Negative  for dizziness, sensory change, speech change, focal weakness, weakness and headaches.   Psychiatric/Behavioral: The patient is not nervous/anxious.         Physical Exam  Temp:  [36.5 °C (97.7 °F)-36.8 °C (98.2 °F)] 36.5 °C (97.7 °F)  Pulse:  [83-95] 85  Resp:  [18-22] 20  BP: (119-143)/(70-99) 123/73  SpO2:  [92 %-96 %] 96 %    Physical Exam  Vitals and nursing note reviewed.   Constitutional:       Appearance: He is obese.   HENT:      Head: Normocephalic and atraumatic.      Nose: No congestion.      Mouth/Throat:      Mouth: Mucous membranes are moist.   Eyes:      Extraocular Movements: Extraocular movements intact.      Conjunctiva/sclera: Conjunctivae normal.   Cardiovascular:      Rate and Rhythm: Normal rate and regular rhythm.   Pulmonary:      Effort: Pulmonary effort is normal.      Breath sounds: Decreased air movement present.   Abdominal:      General: There is no distension.      Tenderness: There is abdominal tenderness. There is no guarding or rebound.   Musculoskeletal:      Cervical back: No tenderness.      Right lower leg: Edema present.      Left lower leg: Edema present.   Skin:     Comments: Stasis dermatitis both legs   Neurological:      General: No focal deficit present.      Mental Status: He is alert and oriented to person, place, and time.      Cranial Nerves: No cranial nerve deficit.         Fluids    Intake/Output Summary (Last 24 hours) at 8/7/2021 1033  Last data filed at 8/7/2021 0055  Gross per 24 hour   Intake 240 ml   Output 850 ml   Net -610 ml       Laboratory  Recent Labs     08/05/21  0411 08/06/21  0440   WBC 13.3* 10.1   RBC 5.11 4.93   HEMOGLOBIN 14.0 13.9*   HEMATOCRIT 46.1 45.8   MCV 90.2 92.9   MCH 27.4 28.2   MCHC 30.4* 30.3*   RDW 52.5* 54.3*   PLATELETCT 280 278   MPV 8.8* 9.0     Recent Labs     08/05/21  0411 08/06/21  0440   SODIUM 135 138   POTASSIUM 4.8 4.3   CHLORIDE 98 99   CO2 25 29   GLUCOSE 143* 91   BUN 10 11   CREATININE 1.08 0.85   CALCIUM 8.4* 8.7                    Imaging  DX-CHEST-PORTABLE (1 VIEW)   Final Result      1.  Bibasilar, RIGHT upper lobe and perihilar underinflation atelectasis which could obscure an additional process. This is unchanged.   2.  Persistently enlarged cardiac silhouette      DX-CHEST-LIMITED (1 VIEW)   Final Result      1.  Increase in bilateral atelectasis      2.  Enlarged cardiac silhouette           Assessment/Plan  * S/P laparoscopic sleeve gastrectomy- (present on admission)  Assessment & Plan  Pain control  Diet per surgery    Acute on chronic respiratory failure with hypoxia and hypercapnia (HCC)- (present on admission)  Assessment & Plan  Probable obesity hypoventilation syndrome  Probable pulmonary hypertension  IV Lasix for diuresis  BiPAP as needed  RT protocol, Incentive spirometry  PT and OT evaluations    Obstructive sleep apnea on CPAP- (present on admission)  Assessment & Plan  CPAP with oxygen supplementation  Will need outpatient sleep study  Nocturnal oximetry?    Venous stasis dermatitis of both lower extremities- (present on admission)  Assessment & Plan  monitor    Essential hypertension, benign- (present on admission)  Assessment & Plan  Losartan    Morbid obesity (HCC)- (present on admission)  Assessment & Plan  Body mass index is 62.39 kg/m².       VTE prophylaxis: enoxaparin ppx    I have performed a physical exam and reviewed and updated ROS and Plan today (8/7/2021). In review of yesterday's note (8/6/2021), there are no changes except as documented above.

## 2021-08-07 NOTE — PROGRESS NOTES
Surgical Progress Note    Author: BALDEMAR Haynes Date & Time created: 2021   9:24 AM     Interval Events:  POD#3 Laparoscopic sleeve gastrectomy      Review of Systems   Constitutional: Negative for chills and fever.   Respiratory: Negative for cough and shortness of breath.    Cardiovascular: Negative for chest pain and palpitations.   Gastrointestinal: Positive for abdominal pain ( luq incisional). Negative for diarrhea, heartburn, nausea and vomiting.   Genitourinary: Negative for dysuria.     Hemodynamics:  Temp (24hrs), Av.7 °C (98 °F), Min:36.5 °C (97.7 °F), Max:36.8 °C (98.2 °F)  Temperature: 36.5 °C (97.7 °F)  Pulse  Av  Min: 74  Max: 111   Blood Pressure: 123/73     Respiratory:    Respiration: 20, Pulse Oximetry: 96 %     Work Of Breathing / Effort: Mild  RUL Breath Sounds: Diminished, RML Breath Sounds: Diminished, RLL Breath Sounds: Diminished, JOSE MANUEL Breath Sounds: Diminished, LLL Breath Sounds: Diminished  Neuro:  GCS       Fluids:    Intake/Output Summary (Last 24 hours) at 2021 0924  Last data filed at 2021 0055  Gross per 24 hour   Intake 440 ml   Output 850 ml   Net -410 ml       Current Diet Order   Procedures   • Diet Order Diet: Clear Liquid; Miscellaneous modifications: (optional): Bariatric     Physical Exam  Vitals and nursing note reviewed.   Constitutional:       General: He is not in acute distress.     Appearance: He is obese.   HENT:      Head: Normocephalic and atraumatic.   Eyes:      Conjunctiva/sclera: Conjunctivae normal.   Cardiovascular:      Rate and Rhythm: Normal rate and regular rhythm.   Pulmonary:      Effort: Pulmonary effort is normal.   Abdominal:      General: There is no distension.      Palpations: Abdomen is soft.      Tenderness: There is abdominal tenderness.   Musculoskeletal:      Cervical back: Normal range of motion and neck supple.   Skin:     General: Skin is warm and dry.   Neurological:      Mental Status: He is alert.    Psychiatric:         Mood and Affect: Mood normal.         Behavior: Behavior normal.         Thought Content: Thought content normal.         Judgment: Judgment normal.       Labs:  No results found for this or any previous visit (from the past 24 hour(s)).  Medical Decision Making, by Problem:  Active Hospital Problems    Diagnosis    • S/P laparoscopic sleeve gastrectomy [Z98.84]    • Acute on chronic respiratory failure with hypoxia and hypercapnia (HCC) [J96.21, J96.22]    • Essential hypertension, benign [I10]    • Morbid obesity (HCC) [E66.01]    • Obstructive sleep apnea on CPAP [G47.33, Z99.89]    • Venous stasis dermatitis of both lower extremities [I87.2]      Plan:  Tolerating clears without nausea/vomiting. Admits to typical incisional abdominal pain, controlled with medication. Pt is ambulating and voiding. Down to 3LNC nc during day, BiPap at 11L at noc.  VSS. Labs pending will review when available.  Encouraged ambulation and IS today.  Discussed possible discharge tomorrow due to transportation issues if ok with medicine.  Appreciate medicine's help with management of this patient.  Pt discussed and reviewed with Dr Clarke.      Quality Measures:  Quality-Core Measures   Reviewed items::  Labs reviewed  Monet catheter::  No Monet  DVT prophylaxis pharmacological::  Enoxaparin (Lovenox)  DVT prophylaxis - mechanical:  SCDs  Ulcer Prophylaxis::  Yes      Discussed patient condition with Patient and Dr Clarek

## 2021-08-07 NOTE — CARE PLAN
Problem: Knowledge Deficit - Standard  Goal: Patient and family/care givers will demonstrate understanding of plan of care, disease process/condition, diagnostic tests and medications  Outcome: Progressing     Problem: Skin Integrity  Goal: Skin integrity is maintained or improved  Outcome: Progressing     Problem: Mobility  Goal: Patient's capacity to carry out activities will improve  Outcome: Progressing   The patient is Stable - Low risk of patient condition declining or worsening    Shift Goals  Clinical Goals: (P) mantain adequate ventilation/oxygenation  Patient Goals: (P) mobilize  Family Goals: family not present    Progress made toward(s) clinical / shift goals:  Patient up and ambulating with staff. Demonstrating effective use of incentive spirometry.     Patient is not progressing towards the following goals:

## 2021-08-07 NOTE — CARE PLAN
Problem: Pain - Standard  Goal: Alleviation of pain or a reduction in pain to the patient’s comfort goal  Outcome: Progressing     Problem: Fall Risk  Goal: Patient will remain free from falls  Outcome: Progressing     Problem: Skin Integrity  Goal: Skin integrity is maintained or improved  Outcome: Progressing     The patient is Stable - Low risk of patient condition declining or worsening    Shift Goals  Clinical Goals: transfer to [place]  Patient Goals: discharge  Family Goals: family not present

## 2021-08-07 NOTE — PROGRESS NOTES
Received report from SICU.  Pt arrived to T402 bed 0 via wheelchair  No immediate distress.  A&Ox4  C/O pain, medicated per MAR 0/10  Denies n/v  Drains none  Oriented to room, educated on welcome packet, white board, TV, call light, call before fall, plan of care, oral care expectations, ambulation goals, and up to chair for all meals goal.  Call light and personal belongings within reach.  Fall precautions and hourly rounding in place

## 2021-08-07 NOTE — PROGRESS NOTES
Received report from AM RN; assumed care. A&O x 4. VSS. 96% on 3L NC. CPAP applied by RT. SOB noted with exertion. Patient denied nausea, vomiting, numbness, tingling. Medicated for pain; see MAR. Abdomen round, distended, tender LUQ. X 6 lap stabs covered with steri-strips/bandaids. X 1 of the 6 covered with gauze/tape. Hypoactive  BS X 4 noted. BLE lymphedema noted; acrocyanosis noted; flaky/scabbed/edematous skin noted. Patient stated Dr. Becker following and to perform vascular repair after weight loss. + void, yellow urine noted. + eructation. + flatus. LBM PTA. Patient tolerating clear liquids/ice chips. Patient assisted EOB and to/from bathroom x 2 person assist initially. X 1 person assist as shift progressed. Difficulty noted getting OOB/truncal flexion. Patient assisted up to recliner chair after 0445 this AM; patient upright for remainder of shift. POC discussed. Questions answered. Bed locked/lowest position. Call light/personal belongings within reach. All needs met throughout shift.

## 2021-08-07 NOTE — PROGRESS NOTES
"Pulmonary Consultation Note    Patient ID:   Name:             Kulwinder Cason   YOB: 1960  Age:                 60 y.o.  male   MRN:               [id number]  Referring Provider: Dr. Manrique                                                  History of Present Illness:   Mr. Cason is a 60 YOM with PMHx of morbid obesity (BMI > 61), RUSS, HTN who came in to the hospital for scheduled laparoscopic sleeve gastrectomy on 8/4. Post-operatively, pt was somnolent and hypoxic and required BiPAP, which led to improvement in hypercapnia and hypoxia. Pt slowly become more alert and oriented. Currently using CPAP (also uses at home, but unclear on settings), requiring 11 cm. Pt is not normally on home oxygen.    Pulmonology was consulted for assistance with hypoxia and weaning of oxygen.      Review of Systems: Review of Systems   Constitutional: Positive for malaise/fatigue (better). Negative for chills and fever.   HENT: Negative for sore throat.    Eyes: Negative.    Respiratory: Negative for cough, sputum production and shortness of breath.    Cardiovascular: Positive for leg swelling (chronic, better). Negative for chest pain and palpitations.   Gastrointestinal: Positive for abdominal pain (better). Negative for diarrhea, nausea and vomiting.   Genitourinary: Negative.    Musculoskeletal: Positive for back pain (Chronic, mild).   Neurological: Negative for focal weakness and headaches.   Endo/Heme/Allergies: Does not bruise/bleed easily.   Psychiatric/Behavioral: The patient is not nervous/anxious.        Past Medical History:   Past Medical History:   Diagnosis Date    Arthritis     Bowel habit changes     Breath shortness 07/26/2021    Pt states \"related to weight and size\".    Bronchitis 2020    Hiatus hernia syndrome 2010    hernia repair-umbilical    Hypertension     IBS (irritable bowel syndrome)     Lymphedema     Legs, abdomin and arms.  Uses a pump and compression hose.    Obesity  " "   Pain 07/26/2021    Lymphadema pain. Neuropathy.    Pickwickian syndrome (HCC)     Pneumonia     In the past.    Sleep apnea 07/26/2021    Uses CPAP.    Snoring     RUSS-CPAP @ NOC       Past Surgical History:   Past Surgical History:   Procedure Laterality Date    PB LAP, BHAVIK RESTRICT PROC, LONGITUDINAL GAS*  8/4/2021    Procedure: GASTRECTOMY, SLEEVE, LAPAROSCOPIC.;  Surgeon: Christian Clarke M.D.;  Location: SURGERY [facility];  Service: General    OTHER  2019    Colonoscopy    CHOLECYSTECTOMY  2010    With hernia repair.    OTHER      Right foot - tendonitis.    OTHER ORTHOPEDIC SURGERY      Right meniscus.       Family History: family history includes Arthritis in his father and maternal grandfather; Cancer in his maternal grandfather; Diabetes in his sister; Glaucoma in his father; Heart Disease in his father; Hyperlipidemia in his mother; Hypertension in his maternal grandmother and mother; Lung Disease in his mother; Parkinson's Disease in his father; Psychiatric Illness in his sister; Stroke in his mother.    Social History:  reports that he has never smoked. He has never used smokeless tobacco. He reports previous alcohol use. He reports that he does not use drugs.    Objective:   Vitals/ General Appearance:   Weight/BMI: Body mass index is 62.39 kg/m².  /73   Pulse 85   Temp 36.5 °C (97.7 °F) (Temporal)   Resp 20   Ht 1.88 m (6' 2\")   Wt (!) 220 kg (485 lb 14.3 oz)   SpO2 96%   Vitals:    08/06/21 1919 08/06/21 2220 08/07/21 0320 08/07/21 0751   BP: 143/78 119/70 138/78 123/73   Pulse: 84 92 87 85   Resp: 18 18 18 20   Temp: 36.7 °C (98.1 °F) 36.8 °C (98.2 °F) 36.7 °C (98.1 °F) 36.5 °C (97.7 °F)   TempSrc: Temporal Temporal Temporal Temporal   SpO2: 95% 93% 92% 96%   Weight:       Height:         Oxygen Therapy:  Pulse Oximetry: 96 %, O2 (LPM): 3, O2 Delivery Device: Silicone Nasal Cannula    Constitutional:   NAD, well-appearing, obese  HENNT:  NC/AT, EOMI, nl conjunctiva, MMM  Eyes:  EOMI, " Conjunctiva normal, No discharge.  Neck:  Normal range of motion, No cervical tenderness,  no JVD.  Cardiovascular:  RRR, no MRG, nl S1, S2  Lungs:  Decreased bibasilar sounds, on 6L O2  Abdomen: Mild abdominal tenderness, soft, ND, nl bowel sounds  Skin: Chronic venous stasis changes in BLE, mild BLE edema  Neurologic: A&O x3, CN II-XII grossly intact  Psychiatric: Affect normal, Judgment normal, Mood normal.    Labs:  Recent Labs     08/05/21 0411 08/06/21  0440   WBC 13.3* 10.1   RBC 5.11 4.93   HEMOGLOBIN 14.0 13.9*   HEMATOCRIT 46.1 45.8   MCV 90.2 92.9   MCH 27.4 28.2   MCHC 30.4* 30.3*   RDW 52.5* 54.3*   PLATELETCT 280 278   MPV 8.8* 9.0                 Recent Labs     08/05/21 0411 08/06/21  0440   SODIUM 135 138   POTASSIUM 4.8 4.3   CHLORIDE 98 99   CO2 25 29   GLUCOSE 143* 91   BUN 10 11     Recent Labs     08/05/21 0411 08/06/21  0440   SODIUM 135 138   POTASSIUM 4.8 4.3   CHLORIDE 98 99   CO2 25 29   BUN 10 11   CREATININE 1.08 0.85   MAGNESIUM 1.9 2.2   PHOSPHORUS 3.6 2.9   CALCIUM 8.4* 8.7     No results found for this or any previous visit.      Imaging:   DX-CHEST-PORTABLE (1 VIEW)   Final Result      1.  Bibasilar, RIGHT upper lobe and perihilar underinflation atelectasis which could obscure an additional process. This is unchanged.   2.  Persistently enlarged cardiac silhouette      DX-CHEST-LIMITED (1 VIEW)   Final Result      1.  Increase in bilateral atelectasis      2.  [facility]    Current Facility-Administered Medications:     furosemide (LASIX) injection 20 mg, 20 mg, Intravenous, Q DAY, Pa Manrique M.D., 20 mg at 08/07/21 0451    potassium chloride SA (Kdur) tablet 20 mEq, 20 mEq, Oral, DAILY, Pa Manrique M.D., 20 mEq at 08/07/21 0451    scopolamine (TRANSDERM-SCOP) patch 1 Patch, 1 Patch, Transdermal, Q72HRS, Christian Clarke M.D., 1 Patch at 08/04/21 1415    losartan (COZAAR) tablet 100 mg, 100 mg, Oral, QHS, Yung Mayo,  A.P.R.N., 100 mg at 08/06/21 2216    Respiratory Therapy Consult, , Nebulization, Continuous RT, KVNG HaynesRDINORA.    enoxaparin (LOVENOX) inj 60 mg, 60 mg, Subcutaneous, BID, TRICE Haynes.P.R.N., 60 mg at 08/06/21 2216    Pharmacy Consult Request ...Pain Management Review 1 Each, 1 Each, Other, PHARMACY TO DOSE, LUIS HaynesP.RMIKEY    [COMPLETED] acetaminophen (OFIRMEV) injection 1,000 mg, 1,000 mg, Intravenous, Q6HRS, 1,000 mg at 08/05/21 0510 **FOLLOWED BY** acetaminophen (TYLENOL) tablet 1,000 mg, 1,000 mg, Oral, Q6HR, 1,000 mg at 08/07/21 0451 **FOLLOWED BY** [START ON 8/9/2021] acetaminophen (TYLENOL) tablet 1,000 mg, 1,000 mg, Oral, Q6HRS PRN, TRICE Haynes.P.R.N.    oxyCODONE (ROXICODONE) oral solution 5 mg, 5 mg, Oral, Q3HRS PRN, 5 mg at 08/07/21 0047 **OR** oxyCODONE (ROXICODONE) oral solution 10 mg, 10 mg, Oral, Q3HRS PRN **OR** HYDROmorphone (Dilaudid) injection 0.5 mg, 0.5 mg, Intravenous, Q3HRS PRN, TRICE Haynes.P.R.N.    ondansetron (ZOFRAN) syringe/vial injection 4 mg, 4 mg, Intravenous, Q4HRS PRN, TRICE Haynes.P.R.N.    promethazine (PHENERGAN) suppository 25 mg, 25 mg, Rectal, Q4HRS PRN, TRICE Haynes.P.R.N.    famotidine (PEPCID) injection 20 mg, 20 mg, Intravenous, BID, TRICE Haynes.P.R.N., 20 mg at 08/07/21 0452    gabapentin (NEURONTIN) capsule 300 mg, 300 mg, Oral, TID, Yung Mayo, A.P.R.N., 300 mg at 08/07/21 0451    simethicone (MYLICON) chewable tab 80 mg, 80 mg, Oral, TID PRN, Yung Mayo, A.P.R.N.    calcium carbonate (TUMS) chewable tab 500 mg, 500 mg, Oral, Q2HRS PRN, Yung Mayo A.P.R.N.    benzocaine-menthol (CEPACOL) lozenge 1 Lozenge, 1 Lozenge, Mouth/Throat, Q4HRS PRN, Yung Mayo A.P.R.N.    labetalol (NORMODYNE/TRANDATE) injection 10-20 mg, 10-20 mg, Intravenous, Q4HRS PRN, Jeremy M Gonda, M.D.    Current Outpatient Medications:  Medications Prior to Admission   Medication Sig Dispense Refill Last Dose    losartan (COZAAR) 100 MG Tab  Take 100 mg by mouth at bedtime.   8/3/2021 at 2000    Multiple Vitamins-Minerals (MULTIVITAMIN GUMMIES MENS PO) Take 2 Tablets by mouth every day.   7/21/2021 at am    potassium Chloride ER (K-TAB) 20 MEQ Tab CR tablet Take 20 mEq by mouth 1 time a day as needed.   8/1/2021 at Unknown time       Medication Allergy:  Allergies   Allergen Reactions    Codeine Nausea    Latex Rash     gloves       Assessment and Plan:  #Acute hypoxia  #RUSS  #Type II pulmonary hypertension  #S/p sleeve gastrectomy  CPAP at home, unsure current settings. Volume up by CXR. Currently on 6L, not on oxygen at home - likely temporary and surgery-related. Symptoms are likely related to RV heart issues from type II pHTN  -titrate oxygen to 88%  -Lasix 40mg IV BID diuretic  -I/O close monitoring  -Auto CPAP 10-20cm H20 at night and during naps (titrate up to [id number], as home setting is 11)  -Referral for sleep medicine/study placed

## 2021-08-08 LAB
ALBUMIN SERPL BCP-MCNC: 3.1 G/DL (ref 3.2–4.9)
ALBUMIN/GLOB SERPL: 1 G/DL
ALP SERPL-CCNC: 56 U/L (ref 30–99)
ALT SERPL-CCNC: 19 U/L (ref 2–50)
ANION GAP SERPL CALC-SCNC: 11 MMOL/L (ref 7–16)
AST SERPL-CCNC: 19 U/L (ref 12–45)
BILIRUB SERPL-MCNC: 0.5 MG/DL (ref 0.1–1.5)
BUN SERPL-MCNC: 12 MG/DL (ref 8–22)
CALCIUM SERPL-MCNC: 8.6 MG/DL (ref 8.5–10.5)
CHLORIDE SERPL-SCNC: 97 MMOL/L (ref 96–112)
CO2 SERPL-SCNC: 31 MMOL/L (ref 20–33)
CREAT SERPL-MCNC: 0.77 MG/DL (ref 0.5–1.4)
ERYTHROCYTE [DISTWIDTH] IN BLOOD BY AUTOMATED COUNT: 51.8 FL (ref 35.9–50)
GLOBULIN SER CALC-MCNC: 3.2 G/DL (ref 1.9–3.5)
GLUCOSE SERPL-MCNC: 96 MG/DL (ref 65–99)
HCT VFR BLD AUTO: 46 % (ref 42–52)
HGB BLD-MCNC: 14.1 G/DL (ref 14–18)
MCH RBC QN AUTO: 27.6 PG (ref 27–33)
MCHC RBC AUTO-ENTMCNC: 30.7 G/DL (ref 33.7–35.3)
MCV RBC AUTO: 90.2 FL (ref 81.4–97.8)
PLATELET # BLD AUTO: 276 K/UL (ref 164–446)
PMV BLD AUTO: 9.2 FL (ref 9–12.9)
POTASSIUM SERPL-SCNC: 3.7 MMOL/L (ref 3.6–5.5)
PROT SERPL-MCNC: 6.3 G/DL (ref 6–8.2)
RBC # BLD AUTO: 5.1 M/UL (ref 4.7–6.1)
SODIUM SERPL-SCNC: 139 MMOL/L (ref 135–145)
WBC # BLD AUTO: 7.3 K/UL (ref 4.8–10.8)

## 2021-08-08 PROCEDURE — 99232 SBSQ HOSP IP/OBS MODERATE 35: CPT | Performed by: FAMILY MEDICINE

## 2021-08-08 PROCEDURE — 700102 HCHG RX REV CODE 250 W/ 637 OVERRIDE(OP): Performed by: INTERNAL MEDICINE

## 2021-08-08 PROCEDURE — 94760 N-INVAS EAR/PLS OXIMETRY 1: CPT

## 2021-08-08 PROCEDURE — 80053 COMPREHEN METABOLIC PANEL: CPT

## 2021-08-08 PROCEDURE — 85027 COMPLETE CBC AUTOMATED: CPT

## 2021-08-08 PROCEDURE — A9270 NON-COVERED ITEM OR SERVICE: HCPCS | Performed by: NURSE PRACTITIONER

## 2021-08-08 PROCEDURE — 700111 HCHG RX REV CODE 636 W/ 250 OVERRIDE (IP): Performed by: STUDENT IN AN ORGANIZED HEALTH CARE EDUCATION/TRAINING PROGRAM

## 2021-08-08 PROCEDURE — 770001 HCHG ROOM/CARE - MED/SURG/GYN PRIV*

## 2021-08-08 PROCEDURE — 700102 HCHG RX REV CODE 250 W/ 637 OVERRIDE(OP): Performed by: NURSE PRACTITIONER

## 2021-08-08 PROCEDURE — 94660 CPAP INITIATION&MGMT: CPT

## 2021-08-08 PROCEDURE — 700111 HCHG RX REV CODE 636 W/ 250 OVERRIDE (IP): Performed by: NURSE PRACTITIONER

## 2021-08-08 PROCEDURE — A9270 NON-COVERED ITEM OR SERVICE: HCPCS | Performed by: INTERNAL MEDICINE

## 2021-08-08 PROCEDURE — 99233 SBSQ HOSP IP/OBS HIGH 50: CPT | Mod: GC | Performed by: INTERNAL MEDICINE

## 2021-08-08 RX ADMIN — FUROSEMIDE 40 MG: 10 INJECTION, SOLUTION INTRAMUSCULAR; INTRAVENOUS at 17:25

## 2021-08-08 RX ADMIN — FAMOTIDINE 20 MG: 10 INJECTION INTRAVENOUS at 06:14

## 2021-08-08 RX ADMIN — POTASSIUM CHLORIDE 20 MEQ: 1500 TABLET, EXTENDED RELEASE ORAL at 06:14

## 2021-08-08 RX ADMIN — GABAPENTIN 300 MG: 300 CAPSULE ORAL at 06:14

## 2021-08-08 RX ADMIN — FUROSEMIDE 40 MG: 10 INJECTION, SOLUTION INTRAMUSCULAR; INTRAVENOUS at 06:14

## 2021-08-08 RX ADMIN — ENOXAPARIN SODIUM 60 MG: 60 INJECTION SUBCUTANEOUS at 21:35

## 2021-08-08 RX ADMIN — LOSARTAN POTASSIUM 100 MG: 50 TABLET, FILM COATED ORAL at 21:35

## 2021-08-08 RX ADMIN — GABAPENTIN 300 MG: 300 CAPSULE ORAL at 12:32

## 2021-08-08 RX ADMIN — GABAPENTIN 300 MG: 300 CAPSULE ORAL at 17:25

## 2021-08-08 RX ADMIN — ACETAMINOPHEN 1000 MG: 500 TABLET, FILM COATED ORAL at 06:14

## 2021-08-08 RX ADMIN — FAMOTIDINE 20 MG: 10 INJECTION INTRAVENOUS at 17:25

## 2021-08-08 RX ADMIN — ENOXAPARIN SODIUM 60 MG: 60 INJECTION SUBCUTANEOUS at 09:44

## 2021-08-08 RX ADMIN — ACETAMINOPHEN 1000 MG: 500 TABLET, FILM COATED ORAL at 12:31

## 2021-08-08 ASSESSMENT — ENCOUNTER SYMPTOMS
FOCAL WEAKNESS: 0
PALPITATIONS: 0
SORE THROAT: 0
COUGH: 0
CHILLS: 0
BACK PAIN: 0
NAUSEA: 0
BRUISES/BLEEDS EASILY: 0
HEARTBURN: 0
DIARRHEA: 0
WEAKNESS: 0
HEADACHES: 0
EYES NEGATIVE: 1
DIAPHORESIS: 0
ABDOMINAL PAIN: 1
SPEECH CHANGE: 0
BLURRED VISION: 0
NECK PAIN: 0
SPUTUM PRODUCTION: 0
SENSORY CHANGE: 0
WHEEZING: 0
VOMITING: 0
SHORTNESS OF BREATH: 0
DIZZINESS: 0
NERVOUS/ANXIOUS: 0
MYALGIAS: 0
FEVER: 0
BACK PAIN: 1
FLANK PAIN: 0

## 2021-08-08 ASSESSMENT — PAIN DESCRIPTION - PAIN TYPE
TYPE: ACUTE PAIN;SURGICAL PAIN
TYPE: ACUTE PAIN
TYPE: ACUTE PAIN;SURGICAL PAIN

## 2021-08-08 NOTE — PROGRESS NOTES
Critical Care/Pulmonary Consultation    Date of Service: 8/8/2021    Date of Admission:  8/4/2021 12:05 PM    Consulting Physician: Jarvis Florian M.D.    Chief Complaint:  No chief complaint on file.    Patient ID:   60 YOM with PMHx of morbid obesity (BMI > 61), RUSS, HTN who came in to the hospital for scheduled laparoscopic sleeve gastrectomy on 8/4. Currently on 3L, requiring CPAP at night, with goal of slowly weaning O2 down, if possible.     Interval Update:  Continues 10L with CPAP at night. This morning, currently on 3L of O2 - will attempt to wean to 2L. Was able to ambulate on own without oxygen, although O2 saturation dropped once returning. Subjectively, pt feels good and has no complaints.     Review of Systems   Constitutional: Positive for malaise/fatigue (better). Negative for chills and fever.   HENT: Negative for sore throat.    Eyes: Negative.    Respiratory: Negative for cough, sputum production and shortness of breath.    Cardiovascular: Positive for leg swelling (chronic, better). Negative for chest pain and palpitations.   Gastrointestinal: Positive for abdominal pain (better). Negative for diarrhea, nausea and vomiting.   Genitourinary: Negative.    Musculoskeletal: Positive for back pain (Chronic, mild).   Neurological: Negative for focal weakness and headaches.   Endo/Heme/Allergies: Does not bruise/bleed easily.   Psychiatric/Behavioral: The patient is not nervous/anxious.        Home Medications     Reviewed by Anahi Carbajal R.N. (Registered Nurse) on 08/04/21 at 1423  Med List Status: Complete   Medication Last Dose Status   losartan (COZAAR) 100 MG Tab 8/3/2021 Active   Multiple Vitamins-Minerals (MULTIVITAMIN GUMMIES MENS PO) 7/21/2021 Active   potassium Chloride ER (K-TAB) 20 MEQ Tab CR tablet 8/1/2021 Active                Social History     Tobacco Use   • Smoking status: Never Smoker   • Smokeless tobacco: Never Used   Vaping Use   • Vaping Use: Never used   Substance Use  "Topics   • Alcohol use: Not Currently   • Drug use: Never        Past Medical History:   Diagnosis Date   • Arthritis    • Bowel habit changes    • Breath shortness 07/26/2021    Pt states \"related to weight and size\".   • Bronchitis 2020   • Hiatus hernia syndrome 2010    hernia repair-umbilical   • Hypertension    • IBS (irritable bowel syndrome)    • Lymphedema     Legs, abdomin and arms.  Uses a pump and compression hose.   • Obesity    • Pain 07/26/2021    Lymphadema pain. Neuropathy.   • Pickwickian syndrome (HCC)    • Pneumonia     In the past.   • Sleep apnea 07/26/2021    Uses CPAP.   • Snoring     RUSS-CPAP @ NOC       Past Surgical History:   Procedure Laterality Date   • PB LAP, BHAVIK RESTRICT PROC, LONGITUDINAL GAS*  8/4/2021    Procedure: GASTRECTOMY, SLEEVE, LAPAROSCOPIC.;  Surgeon: Christian Clarke M.D.;  Location: SURGERY [facility];  Service: General   • OTHER  2019    Colonoscopy   • CHOLECYSTECTOMY  2010    With hernia repair.   • OTHER      Right foot - tendonitis.   • OTHER ORTHOPEDIC SURGERY      Right meniscus.       Allergies: Codeine and Latex    Family History   Problem Relation Age of Onset   • Lung Disease Mother    • Hypertension Mother    • Hyperlipidemia Mother    • Stroke Mother    • Arthritis Father    • Heart Disease Father    • Parkinson's Disease Father    • Glaucoma Father    • Diabetes Sister    • Psychiatric Illness Sister    • Hypertension Maternal Grandmother    • Arthritis Maternal Grandfather    • Cancer Maternal Grandfather        Temp:  [36 °C (96.8 °F)-36.4 °C (97.6 °F)] 36.3 °C (97.3 °F)  Pulse:  [86-98] 95  Resp:  [18-20] 18  BP: (102-132)/(65-79) 102/76  SpO2:  [89 %-94 %] 94 %    Physical Examination  Physical Exam  Vitals and nursing note reviewed.   Constitutional:       Appearance: He is obese.   HENT:      Head: Normocephalic and atraumatic.      Nose: No congestion.      Mouth/Throat:      Mouth: Mucous membranes are moist.   Eyes:      Extraocular Movements: " Extraocular movements intact.      Conjunctiva/sclera: Conjunctivae normal.   Cardiovascular:      Rate and Rhythm: Normal rate and regular rhythm.   Pulmonary:      Effort: No respiratory distress.      Breath sounds: No decreased air movement. No wheezing or rales.      Comments: Decreased bibasilar sounds, on 3L  Abdominal:      General: There is no distension.      Tenderness: There is abdominal tenderness. There is no guarding or rebound.   Musculoskeletal:      Cervical back: No tenderness.      Right lower leg: Edema (1+ pitting) present.      Left lower leg: Edema (1+ pitting) present.   Skin:     Comments: Stasis dermatitis both legs   Neurological:      General: No focal deficit present.      Mental Status: He is alert and oriented to person, place, and time.      Cranial Nerves: No cranial nerve deficit.           Intake/Output Summary (Last 24 hours) at 8/8/2021 1158  Last data filed at 8/7/2021 2354  Gross per 24 hour   Intake --   Output 1750 ml   Net -1750 ml       Recent Labs     08/06/21  0440 08/07/21  1008 08/08/21  0218 08/08/21  0352   WBC 10.1 8.2 7.3  --    ASTSGOT 19 20  --  19   ALTSGPT 20 19  --  19   ALKPHOSPHAT 55 58  --  56   TBILIRUBIN 0.4 0.5  --  0.5     Recent Labs     08/06/21  0440 08/07/21  1008 08/08/21  0352   SODIUM 138 137 139   POTASSIUM 4.3 4.2 3.7   CHLORIDE 99 96 97   CO2 29 32 31   BUN 11 14 12   CREATININE 0.85 1.03 0.77   MAGNESIUM 2.2  --   --    PHOSPHORUS 2.9  --   --    CALCIUM 8.7 8.8 8.6     Recent Labs     08/06/21  0440 08/07/21  1008 08/08/21  0352   ALTSGPT 20 19 19   ASTSGOT 19 20 19   ALKPHOSPHAT 55 58 56   TBILIRUBIN 0.4 0.5 0.5   GLUCOSE 91 87 96         DX-CHEST-PORTABLE (1 VIEW)   Final Result      1.  Bibasilar, RIGHT upper lobe and perihilar underinflation atelectasis which could obscure an additional process. This is unchanged.   2.  Persistently enlarged cardiac silhouette      DX-CHEST-LIMITED (1 VIEW)   Final Result      1.  Increase in bilateral  atelectasis      2.  Enlarged cardiac silhouette          Patient Active Problem List   Diagnosis   • Morbid obesity (HCC)   • Obstructive sleep apnea on CPAP   • Essential hypertension, benign   • Venous stasis dermatitis of both lower extremities   • Acute on chronic respiratory failure with hypoxia and hypercapnia (HCC)   • S/P laparoscopic sleeve gastrectomy       Assessment and Plan:  #Acute hypoxia  #RUSS  #Type II pHTN  #S/p sleeve gastrectomy  - Symptoms are likely related to RV heart issues from type II pHTN  - Titrate oxygen to 88%  - Continue IV Lasix (40 mg, BID) with strict I&O monitoring (net negative of 1510 mL yesterday)  - Continue CPAP (titrate up as needed, but around baseline)  - Outpatient sleep study order placed  - Will need home O2 eval tomorrow morning - per primary, plan for discharge tomorrow afternoon     Pulmonology will continue to follow

## 2021-08-08 NOTE — FACE TO FACE
"Face to Face Note  -  Durable Medical Equipment    Jarvis Florian M.D. - NPI: [id number]  I certify that this patient is under my care and that they had a durable medical equipment(DME)face to face encounter by myself that meets the physician DME face-to-face encounter requirements with this patient on:    Date of encounter:   Patient:                    MRN:                       YOB: 2019  Kulwinder Cason  [id number]  1960     The encounter with the patient was in whole, or in part, for the following medical condition, which is the primary reason for durable medical equipment:  Other - Acute on Chronic Respiratory failure    I certify that, based on my findings, the following durable medical equipment is medically necessary:  Oxygen.    HOME O2 Saturation Measurements:(Values must be present for Home Oxygen orders)  Room air sat at rest: 90  Room air sat with amb: 85  With liters of O2: 3, O2 sat at rest with O2: 95  With Liters of O2: 3, O2 sat with amb with O2 : 90       My Clinical findings support the need for the above equipment due to:  Hypoxia    Supporting Symptoms: The patient requires supplemental oxygen, as the following interventions have been tried with limited or no improvement: \"Incentive spirometry    If patient feels more short of breath, they can go up to 6 liters per minute and contact healthcare provider.  "

## 2021-08-08 NOTE — CARE PLAN
The patient is Stable - Low risk of patient condition declining or worsening    Shift Goals  Clinical Goals: mantain adequate ventilation/oxygenation  Patient Goals: mobilize  Family Goals: family not present    Progress made toward(s) clinical / shift goals:  Improved maintenance of oxygen during NOC shift noted. RT following. Patient up to/from bathroom. Ambulating in hallways during AM shift.     Patient is not progressing towards the following goals: NA.

## 2021-08-08 NOTE — PROGRESS NOTES
Received report from AM RN; assumed care. Report received patient up ambulating frequently during shift. A&O x 4. VSS. 90-94% on 3-4L NC/oxymask. 89% on CPAP. 89% on RA while sitting in chair. Patient denied SOB, numbness, tingling, nausea, vomiting, pain while in bed. Pain with truncal flexion. Mild SOB noted with ambulation. Oxymask placed to/from bathroom. Abdomen round, mildly tender. Normoactive BS x 4 noted during assessment. X 6 lap stabs with steri-strips/band-aids; old serosang drainage noted. + void, yellow urine noted. + eructation. LBM 08/08. Patient tolerating bariatric clears. POC discussed. Questions answered. Bed locked/lowest position. Call light/personal belongings within reach. All needs met throughout shift.

## 2021-08-08 NOTE — FACE TO FACE
Face to Face Supporting Documentation - Home Health    The encounter with this patient was in whole or in part the primary reason for home health admission.    Date of encounter:   Patient:                    MRN:                       YOB: 2019  Kulwinder Cason  [id number]  1960     Home health to see patient for:  Skilled Nursing care for assessment, interventions & education    Skilled need for:  New Onset Medical Diagnosis Acute on chronic respiratory failure    Skilled nursing interventions to include:  Comment: PT/OT    Homebound status evidenced by:  Needs the assistance of another person in order to leave the home. Leaving home requires a considerable and taxing effort. There is a normal inability to leave the home.    Community Physician to provide follow up care: BENTLEY Mahoney     Optional Interventions? No      I certify the face to face encounter for this home health care referral meets the CMS requirements and the encounter/clinical assessment with the patient was, in whole, or in part, for the medical condition(s) listed above, which is the primary reason for home health care. Based on my clinical findings: the service(s) are medically necessary, support the need for home health care, and the homebound criteria are met.  I certify that this patient has had a face to face encounter by myself.  Jarvis Florian M.D. - NPI: [id number]

## 2021-08-08 NOTE — PROGRESS NOTES
[facility] Medicine Daily Progress Note    Date of Service  8/8/2021    Chief Complaint  Kulwinder Cason is a 60 y.o. male admitted 8/4/2021 for laparoscopic sleeve gastrectomy.    Hospital Course  Admitted for laparoscopic sleeve gastrectomy.  Known history of morbid obesity, obstructive sleep apnea.  Postoperatively he had acute respiratory failure with hypoxia and hypercapnia, attributed to probable obesity hypoventilation syndrome, sleep apnea, effects of anesthesia.  He received 2 doses of sugammadex, a dose of Narcan and he was placed on BiPAP.  He was started on IV Lasix for diuresis.  He was admitted to the ICU.  Pulmonary medicine has been consulted on the case.     Interval Problem Update  Respiratory failure - O2 3 lpm NC, Home O2 assessment done  RUSS - CPAP with O2 supplementation  HTN - sbp 100-130  Sleeve Gastrectomy - pain controlled, tolerating clear liquids    I have personally seen and examined the patient at bedside. I discussed the plan of care with patient and bedside RN.    Consultants/Specialty  general surgery and pulmonary    Code Status  Full Code    Disposition  Patient is not medically cleared.   Anticipate discharge to to home with organized home healthcare and close outpatient follow-up.  I have placed the appropriate orders for post-discharge needs.    Review of Systems  Review of Systems   Constitutional: Negative for chills, diaphoresis, fever and malaise/fatigue.   HENT: Negative for congestion, hearing loss and sore throat.    Eyes: Negative for blurred vision.   Respiratory: Negative for cough, shortness of breath and wheezing.    Cardiovascular: Positive for leg swelling. Negative for chest pain and palpitations.   Gastrointestinal: Positive for abdominal pain. Negative for diarrhea, heartburn, nausea and vomiting.   Genitourinary: Negative for dysuria, flank pain and hematuria.   Musculoskeletal: Negative for back pain, joint pain, myalgias and neck pain.   Skin:  Negative for rash.   Neurological: Negative for dizziness, sensory change, speech change, focal weakness, weakness and headaches.   Psychiatric/Behavioral: The patient is not nervous/anxious.         Physical Exam  Temp:  [36 °C (96.8 °F)-36.4 °C (97.6 °F)] 36.3 °C (97.3 °F)  Pulse:  [86-98] 95  Resp:  [18-20] 18  BP: (102-132)/(65-79) 102/76  SpO2:  [89 %-94 %] 94 %    Physical Exam  Vitals and nursing note reviewed.   Constitutional:       Appearance: He is obese.   HENT:      Head: Normocephalic and atraumatic.      Nose: No congestion.      Mouth/Throat:      Mouth: Mucous membranes are moist.   Eyes:      Extraocular Movements: Extraocular movements intact.      Conjunctiva/sclera: Conjunctivae normal.   Cardiovascular:      Rate and Rhythm: Normal rate and regular rhythm.   Pulmonary:      Effort: Pulmonary effort is normal.      Breath sounds: Decreased air movement present.   Abdominal:      General: There is no distension.      Tenderness: There is abdominal tenderness. There is no guarding or rebound.   Musculoskeletal:      Cervical back: No tenderness.      Right lower leg: Edema present.      Left lower leg: Edema present.   Skin:     Comments: Stasis dermatitis both legs   Neurological:      General: No focal deficit present.      Mental Status: He is alert and oriented to person, place, and time.      Cranial Nerves: No cranial nerve deficit.         Fluids    Intake/Output Summary (Last 24 hours) at 8/8/2021 1141  Last data filed at 8/7/2021 2354  Gross per 24 hour   Intake --   Output 1750 ml   Net -1750 ml       Laboratory  Recent Labs     08/06/21  0440 08/07/21  1008 08/08/21  0218   WBC 10.1 8.2 7.3   RBC 4.93 5.09 5.10   HEMOGLOBIN 13.9* 14.0 14.1   HEMATOCRIT 45.8 46.8 46.0   MCV 92.9 91.9 90.2   MCH 28.2 27.5 27.6   MCHC 30.3* 29.9* 30.7*   RDW 54.3* 54.4* 51.8*   PLATELETCT 278 279 276   MPV 9.0 9.0 9.2     Recent Labs     08/06/21  0440 08/07/21  1008 08/08/21  0352   SODIUM 138 137 139    POTASSIUM 4.3 4.2 3.7   CHLORIDE 99 96 97   CO2 29 32 31   GLUCOSE 91 87 96   BUN 11 14 12   CREATININE 0.85 1.03 0.77   CALCIUM 8.7 8.8 8.6                   Imaging  DX-CHEST-PORTABLE (1 VIEW)   Final Result      1.  Bibasilar, RIGHT upper lobe and perihilar underinflation atelectasis which could obscure an additional process. This is unchanged.   2.  Persistently enlarged cardiac silhouette      DX-CHEST-LIMITED (1 VIEW)   Final Result      1.  Increase in bilateral atelectasis      2.  Enlarged cardiac silhouette           Assessment/Plan  * S/P laparoscopic sleeve gastrectomy- (present on admission)  Assessment & Plan  Pain control  Diet per surgery    Acute on chronic respiratory failure with hypoxia and hypercapnia (HCC)- (present on admission)  Assessment & Plan  Probable Obesity Hypoventilation Syndrome  Probable Pulmonary hypertension  IV Lasix for diuresis  BiPAP as needed  RT protocol, Incentive spirometry  PT and OT    Obstructive sleep apnea on CPAP- (present on admission)  Assessment & Plan  CPAP with oxygen supplementation  Will need outpatient sleep study    Venous stasis dermatitis of both lower extremities- (present on admission)  Assessment & Plan  monitor    Essential hypertension, benign- (present on admission)  Assessment & Plan  Losartan    Morbid obesity (HCC)- (present on admission)  Assessment & Plan  Body mass index is 62.39 kg/m².       VTE prophylaxis: enoxaparin ppx    I have performed a physical exam and reviewed and updated ROS and Plan today (8/8/2021). In review of yesterday's note (8/7/2021), there are no changes except as documented above.

## 2021-08-08 NOTE — CARE PLAN
Problem: Pain - Standard  Goal: Alleviation of pain or a reduction in pain to the patient’s comfort goal  Outcome: Progressing     Problem: Fall Risk  Goal: Patient will remain free from falls  Outcome: Progressing     Problem: Knowledge Deficit - Standard  Goal: Patient and family/care givers will demonstrate understanding of plan of care, disease process/condition, diagnostic tests and medications  Outcome: Progressing     Problem: Skin Integrity  Goal: Skin integrity is maintained or improved  Outcome: Progressing   The patient is Stable - Low risk of patient condition declining or worsening    Shift Goals  Clinical Goals: increase o2 saturation  Patient Goals: mobilize  Family Goals: family not present    Progress made toward(s) clinical / shift goals:    Patient is regularly using IS, cough/deep breathing and is up to bathroom regularly.     Patient is not progressing towards the following goals:

## 2021-08-08 NOTE — RESPIRATORY CARE
Pt desats unable to do Noc study as he doesn't tolerate on 4 L, CPAP is on and 10L o2 needed to keep Spo2 above 92%

## 2021-08-08 NOTE — PROGRESS NOTES
Surgical Progress Note    Author: BALDEMAR Haynes Date & Time created: 2021   8:24 AM     Interval Events:  POD#4 Laparoscopic sleeve gastrectomy.    Tolerating clears without nausea/vomiting. Admits to typical incisional abdominal pain, controlled with medication. Pt is ambulating and voiding. BLE improved with lasix.  Down to 3LNC during the day with mask and 11L Bipap at night.      Review of Systems   Constitutional: Negative for chills and fever.   Respiratory: Negative for cough and shortness of breath.    Cardiovascular: Negative for chest pain and palpitations.   Gastrointestinal: Positive for abdominal pain ( LUQ incisional, improving). Negative for diarrhea, heartburn, nausea and vomiting.   Genitourinary: Negative for dysuria.     Hemodynamics:  Temp (24hrs), Av.2 °C (97.1 °F), Min:36 °C (96.8 °F), Max:36.4 °C (97.6 °F)  Temperature: 36 °C (96.8 °F)  Pulse  Av.9  Min: 74  Max: 111   Blood Pressure: 109/77     Respiratory:    Respiration: 20, Pulse Oximetry: 89 %     Work Of Breathing / Effort: Mild  RUL Breath Sounds: Diminished, RML Breath Sounds: Diminished, RLL Breath Sounds: Diminished, JOSE MANUEL Breath Sounds: Diminished, LLL Breath Sounds: Diminished  Neuro:  GCS       Fluids:    Intake/Output Summary (Last 24 hours) at 2021 0824  Last data filed at 2021 2354  Gross per 24 hour   Intake 240 ml   Output 1750 ml   Net -1510 ml       Current Diet Order   Procedures   • Diet Order Diet: Clear Liquid; Miscellaneous modifications: (optional): Bariatric     Physical Exam  Vitals and nursing note reviewed.   Constitutional:       General: He is not in acute distress.     Appearance: He is obese.   HENT:      Head: Normocephalic and atraumatic.   Eyes:      Conjunctiva/sclera: Conjunctivae normal.   Cardiovascular:      Rate and Rhythm: Normal rate and regular rhythm.   Pulmonary:      Effort: Pulmonary effort is normal.   Abdominal:      General: There is no distension.       Palpations: Abdomen is soft.      Tenderness: There is abdominal tenderness ( Luq incisional).   Musculoskeletal:      Cervical back: Normal range of motion and neck supple.   Skin:     General: Skin is warm and dry.   Neurological:      Mental Status: He is alert.   Psychiatric:         Mood and Affect: Mood normal.         Behavior: Behavior normal.         Thought Content: Thought content normal.         Judgment: Judgment normal.       Labs:  Recent Results (from the past 24 hour(s))   CBC without Differential (blood)    Collection Time: 08/07/21 10:08 AM   Result Value Ref Range    WBC 8.2 4.8 - 10.8 K/uL    RBC 5.09 4.70 - 6.10 M/uL    Hemoglobin 14.0 14.0 - 18.0 g/dL    Hematocrit 46.8 42.0 - 52.0 %    MCV 91.9 81.4 - 97.8 fL    MCH 27.5 27.0 - 33.0 pg    MCHC 29.9 (L) 33.7 - 35.3 g/dL    RDW 54.4 (H) 35.9 - 50.0 fL    Platelet Count 279 164 - 446 K/uL    MPV 9.0 9.0 - 12.9 fL   Comp Metabolic Panel (CMP)    Collection Time: 08/07/21 10:08 AM   Result Value Ref Range    Sodium 137 135 - 145 mmol/L    Potassium 4.2 3.6 - 5.5 mmol/L    Chloride 96 96 - 112 mmol/L    Co2 32 20 - 33 mmol/L    Anion Gap 9.0 7.0 - 16.0    Glucose 87 65 - 99 mg/dL    Bun 14 8 - 22 mg/dL    Creatinine 1.03 0.50 - 1.40 mg/dL    Calcium 8.8 8.5 - 10.5 mg/dL    AST(SGOT) 20 12 - 45 U/L    ALT(SGPT) 19 2 - 50 U/L    Alkaline Phosphatase 58 30 - 99 U/L    Total Bilirubin 0.5 0.1 - 1.5 mg/dL    Albumin 3.2 3.2 - 4.9 g/dL    Total Protein 6.9 6.0 - 8.2 g/dL    Globulin 3.7 (H) 1.9 - 3.5 g/dL    A-G Ratio 0.9 g/dL   ESTIMATED GFR    Collection Time: 08/07/21 10:08 AM   Result Value Ref Range    GFR If African American >60 >60 mL/min/1.73 m 2    GFR If Non African American >60 >60 mL/min/1.73 m 2   CBC without Differential (blood)    Collection Time: 08/08/21  2:18 AM   Result Value Ref Range    WBC 7.3 4.8 - 10.8 K/uL    RBC 5.10 4.70 - 6.10 M/uL    Hemoglobin 14.1 14.0 - 18.0 g/dL    Hematocrit 46.0 42.0 - 52.0 %    MCV 90.2 81.4 - 97.8 fL     MCH 27.6 27.0 - 33.0 pg    MCHC 30.7 (L) 33.7 - 35.3 g/dL    RDW 51.8 (H) 35.9 - 50.0 fL    Platelet Count 276 164 - 446 K/uL    MPV 9.2 9.0 - 12.9 fL   Comp Metabolic Panel    Collection Time: 08/08/21  3:52 AM   Result Value Ref Range    Sodium 139 135 - 145 mmol/L    Potassium 3.7 3.6 - 5.5 mmol/L    Chloride 97 96 - 112 mmol/L    Co2 31 20 - 33 mmol/L    Anion Gap 11.0 7.0 - 16.0    Glucose 96 65 - 99 mg/dL    Bun 12 8 - 22 mg/dL    Creatinine 0.77 0.50 - 1.40 mg/dL    Calcium 8.6 8.5 - 10.5 mg/dL    AST(SGOT) 19 12 - 45 U/L    ALT(SGPT) 19 2 - 50 U/L    Alkaline Phosphatase 56 30 - 99 U/L    Total Bilirubin 0.5 0.1 - 1.5 mg/dL    Albumin 3.1 (L) 3.2 - 4.9 g/dL    Total Protein 6.3 6.0 - 8.2 g/dL    Globulin 3.2 1.9 - 3.5 g/dL    A-G Ratio 1.0 g/dL   ESTIMATED GFR    Collection Time: 08/08/21  3:52 AM   Result Value Ref Range    GFR If African American >60 >60 mL/min/1.73 m 2    GFR If Non African American >60 >60 mL/min/1.73 m 2     Medical Decision Making, by Problem:  Active Hospital Problems    Diagnosis    • S/P laparoscopic sleeve gastrectomy [Z98.84]    • Acute on chronic respiratory failure with hypoxia and hypercapnia (HCC) [J96.21, J96.22]    • Essential hypertension, benign [I10]    • Morbid obesity (HCC) [E66.01]    • Obstructive sleep apnea on CPAP [G47.33, Z99.89]    • Venous stasis dermatitis of both lower extremities [I87.2]      Plan:  Pt is alert and oriented, NAD. Breathing unlabored. Tolerating PO.  Incisions ok. VS stable. Labs reviewed.  Encouraged ambulation and incentive spirometry.  Wean O2.  Appreciate medicine and pulmonary's help with management of this patient.  Clear for discharge from a surgical standpoint.  Awaiting oxygen concentrator for Monday discharge per patient. Pt reviewed with Dr Clarke.    Quality Measures:  Quality-Core Measures   Reviewed items::  Labs reviewed  Monet catheter::  No Monet  DVT prophylaxis pharmacological::  Enoxaparin (Lovenox)  DVT prophylaxis -  mechanical:  SCDs  Ulcer Prophylaxis::  Yes      Discussed patient condition with Patient and Dr Clarke

## 2021-08-09 VITALS
HEART RATE: 99 BPM | BODY MASS INDEX: 40.43 KG/M2 | WEIGHT: 315 LBS | OXYGEN SATURATION: 92 % | TEMPERATURE: 97.4 F | DIASTOLIC BLOOD PRESSURE: 63 MMHG | RESPIRATION RATE: 17 BRPM | SYSTOLIC BLOOD PRESSURE: 100 MMHG | HEIGHT: 74 IN

## 2021-08-09 PROBLEM — E87.6 HYPOKALEMIA: Status: ACTIVE | Noted: 2021-08-09

## 2021-08-09 LAB
ALBUMIN SERPL BCP-MCNC: 3 G/DL (ref 3.2–4.9)
ALBUMIN/GLOB SERPL: 0.9 G/DL
ALP SERPL-CCNC: 58 U/L (ref 30–99)
ALT SERPL-CCNC: 20 U/L (ref 2–50)
ANION GAP SERPL CALC-SCNC: 12 MMOL/L (ref 7–16)
AST SERPL-CCNC: 16 U/L (ref 12–45)
BILIRUB SERPL-MCNC: 0.5 MG/DL (ref 0.1–1.5)
BUN SERPL-MCNC: 11 MG/DL (ref 8–22)
CALCIUM SERPL-MCNC: 8.8 MG/DL (ref 8.5–10.5)
CHLORIDE SERPL-SCNC: 95 MMOL/L (ref 96–112)
CO2 SERPL-SCNC: 30 MMOL/L (ref 20–33)
CREAT SERPL-MCNC: 0.8 MG/DL (ref 0.5–1.4)
ERYTHROCYTE [DISTWIDTH] IN BLOOD BY AUTOMATED COUNT: 52.9 FL (ref 35.9–50)
GLOBULIN SER CALC-MCNC: 3.5 G/DL (ref 1.9–3.5)
GLUCOSE SERPL-MCNC: 91 MG/DL (ref 65–99)
HCT VFR BLD AUTO: 46.3 % (ref 42–52)
HGB BLD-MCNC: 13.9 G/DL (ref 14–18)
MCH RBC QN AUTO: 27.4 PG (ref 27–33)
MCHC RBC AUTO-ENTMCNC: 30 G/DL (ref 33.7–35.3)
MCV RBC AUTO: 91.1 FL (ref 81.4–97.8)
PLATELET # BLD AUTO: 238 K/UL (ref 164–446)
PMV BLD AUTO: 8.8 FL (ref 9–12.9)
POTASSIUM SERPL-SCNC: 3.5 MMOL/L (ref 3.6–5.5)
PROT SERPL-MCNC: 6.5 G/DL (ref 6–8.2)
RBC # BLD AUTO: 5.08 M/UL (ref 4.7–6.1)
SODIUM SERPL-SCNC: 137 MMOL/L (ref 135–145)
WBC # BLD AUTO: 7.7 K/UL (ref 4.8–10.8)

## 2021-08-09 PROCEDURE — 700102 HCHG RX REV CODE 250 W/ 637 OVERRIDE(OP): Performed by: NURSE PRACTITIONER

## 2021-08-09 PROCEDURE — 700111 HCHG RX REV CODE 636 W/ 250 OVERRIDE (IP): Performed by: NURSE PRACTITIONER

## 2021-08-09 PROCEDURE — 700111 HCHG RX REV CODE 636 W/ 250 OVERRIDE (IP): Performed by: STUDENT IN AN ORGANIZED HEALTH CARE EDUCATION/TRAINING PROGRAM

## 2021-08-09 PROCEDURE — 94760 N-INVAS EAR/PLS OXIMETRY 1: CPT

## 2021-08-09 PROCEDURE — A9270 NON-COVERED ITEM OR SERVICE: HCPCS | Performed by: FAMILY MEDICINE

## 2021-08-09 PROCEDURE — 700102 HCHG RX REV CODE 250 W/ 637 OVERRIDE(OP): Performed by: FAMILY MEDICINE

## 2021-08-09 PROCEDURE — A9270 NON-COVERED ITEM OR SERVICE: HCPCS | Performed by: NURSE PRACTITIONER

## 2021-08-09 PROCEDURE — 85027 COMPLETE CBC AUTOMATED: CPT

## 2021-08-09 PROCEDURE — 99232 SBSQ HOSP IP/OBS MODERATE 35: CPT | Performed by: FAMILY MEDICINE

## 2021-08-09 PROCEDURE — 80053 COMPREHEN METABOLIC PANEL: CPT

## 2021-08-09 RX ORDER — POTASSIUM CHLORIDE 20 MEQ/1
20 TABLET, EXTENDED RELEASE ORAL 2 TIMES DAILY
Status: DISCONTINUED | OUTPATIENT
Start: 2021-08-09 | End: 2021-08-09 | Stop reason: HOSPADM

## 2021-08-09 RX ORDER — FUROSEMIDE 40 MG/1
40 TABLET ORAL 2 TIMES DAILY
Qty: 14 TABLET | Refills: 0 | Status: SHIPPED | OUTPATIENT
Start: 2021-08-09 | End: 2021-08-16

## 2021-08-09 RX ADMIN — FAMOTIDINE 20 MG: 10 INJECTION INTRAVENOUS at 06:14

## 2021-08-09 RX ADMIN — POTASSIUM CHLORIDE 20 MEQ: 1500 TABLET, EXTENDED RELEASE ORAL at 08:37

## 2021-08-09 RX ADMIN — GABAPENTIN 300 MG: 300 CAPSULE ORAL at 12:37

## 2021-08-09 RX ADMIN — ENOXAPARIN SODIUM 60 MG: 60 INJECTION SUBCUTANEOUS at 08:38

## 2021-08-09 RX ADMIN — FUROSEMIDE 40 MG: 10 INJECTION, SOLUTION INTRAMUSCULAR; INTRAVENOUS at 06:14

## 2021-08-09 RX ADMIN — ACETAMINOPHEN 1000 MG: 500 TABLET, FILM COATED ORAL at 06:14

## 2021-08-09 RX ADMIN — GABAPENTIN 300 MG: 300 CAPSULE ORAL at 06:15

## 2021-08-09 ASSESSMENT — ENCOUNTER SYMPTOMS
BRUISES/BLEEDS EASILY: 0
SPUTUM PRODUCTION: 0
ABDOMINAL PAIN: 1
VOMITING: 0
SHORTNESS OF BREATH: 0
FLANK PAIN: 0
MYALGIAS: 0
EYES NEGATIVE: 1
CHILLS: 0
NECK PAIN: 0
WHEEZING: 0
NERVOUS/ANXIOUS: 0
SENSORY CHANGE: 0
DIARRHEA: 0
DIAPHORESIS: 0
HEADACHES: 0
HEARTBURN: 0
BLURRED VISION: 0
COUGH: 0
FOCAL WEAKNESS: 0
SPEECH CHANGE: 0
NAUSEA: 0
BACK PAIN: 1
BACK PAIN: 0
PALPITATIONS: 0
DIZZINESS: 0
WEAKNESS: 0
FEVER: 0
SORE THROAT: 0

## 2021-08-09 ASSESSMENT — PAIN DESCRIPTION - PAIN TYPE
TYPE: ACUTE PAIN;SURGICAL PAIN
TYPE: SURGICAL PAIN

## 2021-08-09 NOTE — PROGRESS NOTES
Patient A&Ox4, VSS on RA.  Ambulating independently.  +BM 8/9, +void.  Pain well controlled.  Tolerating PO.

## 2021-08-09 NOTE — PROGRESS NOTES
Critical Care/Pulmonary Consultation    Date of Service: 8/9/2021    Date of Admission:  8/4/2021 12:05 PM    Consulting Physician: Jarvis Florian M.D.    Chief Complaint:  No chief complaint on file.    Patient ID:   [id number] YOM with PMHx of morbid obesity (BMI > 61), RUSS, HTN who came in to the hospital for scheduled laparoscopic sleeve gastrectomy on 8/4. Currently on 3L, requiring CPAP at night, with goal of slowly weaning O2 down, if possible.      Interval Update:  Saturating well on RA. Required 1L NC at night - used CPAP at night with minimal bleed in per nursing. Feels good, plan for d/c today.     Review of Systems   Constitutional: Positive for malaise/fatigue (better). Negative for chills and fever.   HENT: Negative for sore throat.    Eyes: Negative.    Respiratory: Negative for cough, sputum production and shortness of breath.    Cardiovascular: Positive for leg swelling (chronic, better). Negative for chest pain and palpitations.   Gastrointestinal: Positive for abdominal pain (better). Negative for diarrhea, nausea and vomiting.   Genitourinary: Negative.    Musculoskeletal: Positive for back pain (Chronic, mild).   Neurological: Negative for focal weakness and headaches.   Endo/Heme/Allergies: Does not bruise/bleed easily.   Psychiatric/Behavioral: The patient is not nervous/anxious.        Home Medications     Reviewed by Anahi Carbajal R.N. (Registered Nurse) on 08/04/21 at 1423  Med List Status: Complete   Medication Last Dose Status   losartan (COZAAR) 100 MG Tab 8/3/2021 Active   Multiple Vitamins-Minerals (MULTIVITAMIN GUMMIES MENS PO) 7/21/2021 Active   potassium Chloride ER (K-TAB) 20 MEQ Tab CR tablet 8/1/2021 Active                Social History     Tobacco Use   • Smoking status: Never Smoker   • Smokeless tobacco: Never Used   Vaping Use   • Vaping Use: Never used   Substance Use Topics   • Alcohol use: Not Currently   • Drug use: Never        Past Medical History:   Diagnosis Date   •  "Arthritis    • Bowel habit changes    • Breath shortness 07/26/2021    Pt states \"related to weight and size\".   • Bronchitis 2020   • Hiatus hernia syndrome 2010    hernia repair-umbilical   • Hypertension    • IBS (irritable bowel syndrome)    • Lymphedema     Legs, abdomin and arms.  Uses a pump and compression hose.   • Obesity    • Pain 07/26/2021    Lymphadema pain. Neuropathy.   • Pickwickian syndrome (HCC)    • Pneumonia     In the past.   • Sleep apnea 07/26/2021    Uses CPAP.   • Snoring     RUSS-CPAP @ NOC       Past Surgical History:   Procedure Laterality Date   • PB LAP, BHAVIK RESTRICT PROC, LONGITUDINAL GAS*  8/4/2021    Procedure: GASTRECTOMY, SLEEVE, LAPAROSCOPIC.;  Surgeon: Christian Clarke M.D.;  Location: SURGERY [facility];  Service: General   • OTHER  2019    Colonoscopy   • CHOLECYSTECTOMY  2010    With hernia repair.   • OTHER      Right foot - tendonitis.   • OTHER ORTHOPEDIC SURGERY      Right meniscus.       Allergies: Codeine and Latex    Family History   Problem Relation Age of Onset   • Lung Disease Mother    • Hypertension Mother    • Hyperlipidemia Mother    • Stroke Mother    • Arthritis Father    • Heart Disease Father    • Parkinson's Disease Father    • Glaucoma Father    • Diabetes Sister    • Psychiatric Illness Sister    • Hypertension Maternal Grandmother    • Arthritis Maternal Grandfather    • Cancer Maternal Grandfather        Temp:  [36.1 °C (97 °F)-37 °C (98.6 °F)] 36.3 °C (97.4 °F)  Pulse:  [89-99] 99  Resp:  [16-17] 17  BP: (100-129)/(63-81) 100/63  SpO2:  [92 %-94 %] 92 %    Physical Examination  Physical Exam  Vitals and nursing note reviewed.   Constitutional:       Appearance: He is obese.   HENT:      Head: Normocephalic and atraumatic.      Nose: No congestion.      Mouth/Throat:      Mouth: Mucous membranes are moist.   Eyes:      Extraocular Movements: Extraocular movements intact.      Conjunctiva/sclera: Conjunctivae normal.   Cardiovascular:      Rate and Rhythm: " Normal rate and regular rhythm.   Pulmonary:      Effort: Pulmonary effort is normal. No respiratory distress.      Breath sounds: Normal breath sounds. No decreased air movement. No wheezing or rales.   Abdominal:      General: There is no distension.      Tenderness: There is abdominal tenderness. There is no guarding or rebound.   Musculoskeletal:      Cervical back: No tenderness.      Right lower leg: Edema (2+ pitting) present.      Left lower leg: Edema (2+ pitting) present.   Skin:     Comments: Stasis dermatitis both legs   Neurological:      General: No focal deficit present.      Mental Status: He is alert and oriented to person, place, and time.      Cranial Nerves: No cranial nerve deficit.           Intake/Output Summary (Last 24 hours) at 8/9/2021 1207  Last data filed at 8/9/2021 0831  Gross per 24 hour   Intake 400 ml   Output 900 ml   Net -500 ml       Recent Labs     08/07/21  1008 08/08/21  0218 08/08/21  0352 08/09/21  0555   WBC 8.2 7.3  --  7.7   ASTSGOT 20  --  19 16   ALTSGPT 19  --  19 20   ALKPHOSPHAT 58  --  56 58   TBILIRUBIN 0.5  --  0.5 0.5     Recent Labs     08/07/21  1008 08/08/21  0352 08/09/21  0555   SODIUM 137 139 137   POTASSIUM 4.2 3.7 3.5*   CHLORIDE 96 97 95*   CO2 32 31 30   BUN 14 12 11   CREATININE 1.03 0.77 0.80   CALCIUM 8.8 8.6 8.8     Recent Labs     08/07/21  1008 08/08/21  0352 08/09/21  0555   ALTSGPT 19 19 20   ASTSGOT 20 19 16   ALKPHOSPHAT 58 56 58   TBILIRUBIN 0.5 0.5 0.5   GLUCOSE 87 96 91         DX-CHEST-PORTABLE (1 VIEW)   Final Result      1.  Bibasilar, RIGHT upper lobe and perihilar underinflation atelectasis which could obscure an additional process. This is unchanged.   2.  Persistently enlarged cardiac silhouette      DX-CHEST-LIMITED (1 VIEW)   Final Result      1.  Increase in bilateral atelectasis      2.  Enlarged cardiac silhouette          Patient Active Problem List   Diagnosis   • Morbid obesity (HCC)   • Obstructive sleep apnea on CPAP   •  Essential hypertension, benign   • Venous stasis dermatitis of both lower extremities   • Acute on chronic respiratory failure with hypoxia and hypercapnia (HCC)   • S/P laparoscopic sleeve gastrectomy       Assessment and Plan:  #Acute hypoxia  #RUSS  #Type II pHTN  #S/p sleeve gastrectomy  - Symptoms are likely related to RV heart issues from type II pHTN  - Hypoxia practically resolved  - Send home on PO Lasix (40 mg, BID) and K supplements  - F/u with PCP in 1-2 weeks for repeat BMP  - Outpatient sleep study order placed    Pulmonology will sign off.

## 2021-08-09 NOTE — PROGRESS NOTES
Surgical Progress Note    Author: Liliana Centeno P.A.-C. Date & Time created: 2021   1:15 PM     Interval Events:  POD#5 Laparoscopic sleeve gastrectomy.     Tolerating clears without nausea/vomiting. Admits to typical incisional abdominal pain, controlled with medication. Pt is ambulating and voiding. BLE improved with lasix. Acute hypoxia mostly resolved at this time. Pulmonary will send home PO lasix and K supplements and follow up with PCP. Labs reviewed, VSS.       Review of Systems   Constitutional: Negative for chills and fever.   Respiratory: Negative for cough and shortness of breath.    Cardiovascular: Negative for chest pain and palpitations.   Gastrointestinal: Positive for abdominal pain ( LUQ incisional, improving). Negative for diarrhea, heartburn, nausea and vomiting.   Genitourinary: Negative for dysuria.     Hemodynamics:  Temp (24hrs), Av.5 °C (97.7 °F), Min:36.1 °C (97 °F), Max:37 °C (98.6 °F)  Temperature: 36.3 °C (97.4 °F)  Pulse  Av.1  Min: 74  Max: 111   Blood Pressure: 100/63     Respiratory:    Respiration: 17, Pulse Oximetry: 92 %     Work Of Breathing / Effort: Mild  RUL Breath Sounds: Clear, RML Breath Sounds: Clear, RLL Breath Sounds: Diminished, JOSE MANUEL Breath Sounds: Clear, LLL Breath Sounds: Diminished  Neuro:  GCS       Fluids:    Intake/Output Summary (Last 24 hours) at 2021 1315  Last data filed at 2021 0831  Gross per 24 hour   Intake 400 ml   Output 900 ml   Net -500 ml       Current Diet Order   Procedures   • Diet Order Diet: Clear Liquid; Miscellaneous modifications: (optional): Bariatric     Physical Exam  Vitals and nursing note reviewed.   Constitutional:       General: He is not in acute distress.     Appearance: He is obese.   HENT:      Head: Normocephalic and atraumatic.   Eyes:      Conjunctiva/sclera: Conjunctivae normal.   Cardiovascular:      Rate and Rhythm: Normal rate and regular rhythm.   Pulmonary:      Effort: Pulmonary effort is normal.    Abdominal:      General: There is no distension.      Palpations: Abdomen is soft.      Tenderness: There is abdominal tenderness ( Luq incisional).   Musculoskeletal:      Cervical back: Normal range of motion and neck supple.   Skin:     General: Skin is warm and dry.   Neurological:      Mental Status: He is alert.   Psychiatric:         Mood and Affect: Mood normal.         Behavior: Behavior normal.         Thought Content: Thought content normal.         Judgment: Judgment normal.       Labs:  Recent Results (from the past 24 hour(s))   CBC without Differential (blood)    Collection Time: 08/09/21  5:55 AM   Result Value Ref Range    WBC 7.7 4.8 - 10.8 K/uL    RBC 5.08 4.70 - 6.10 M/uL    Hemoglobin 13.9 (L) 14.0 - 18.0 g/dL    Hematocrit 46.3 42.0 - 52.0 %    MCV 91.1 81.4 - 97.8 fL    MCH 27.4 27.0 - 33.0 pg    MCHC 30.0 (L) 33.7 - 35.3 g/dL    RDW 52.9 (H) 35.9 - 50.0 fL    Platelet Count 238 164 - 446 K/uL    MPV 8.8 (L) 9.0 - 12.9 fL   Comp Metabolic Panel (CMP)    Collection Time: 08/09/21  5:55 AM   Result Value Ref Range    Sodium 137 135 - 145 mmol/L    Potassium 3.5 (L) 3.6 - 5.5 mmol/L    Chloride 95 (L) 96 - 112 mmol/L    Co2 30 20 - 33 mmol/L    Anion Gap 12.0 7.0 - 16.0    Glucose 91 65 - 99 mg/dL    Bun 11 8 - 22 mg/dL    Creatinine 0.80 0.50 - 1.40 mg/dL    Calcium 8.8 8.5 - 10.5 mg/dL    AST(SGOT) 16 12 - 45 U/L    ALT(SGPT) 20 2 - 50 U/L    Alkaline Phosphatase 58 30 - 99 U/L    Total Bilirubin 0.5 0.1 - 1.5 mg/dL    Albumin 3.0 (L) 3.2 - 4.9 g/dL    Total Protein 6.5 6.0 - 8.2 g/dL    Globulin 3.5 1.9 - 3.5 g/dL    A-G Ratio 0.9 g/dL   ESTIMATED GFR    Collection Time: 08/09/21  5:55 AM   Result Value Ref Range    GFR If African American >60 >60 mL/min/1.73 m 2    GFR If Non African American >60 >60 mL/min/1.73 m 2     Medical Decision Making, by Problem:  Active Hospital Problems    Diagnosis    • S/P laparoscopic sleeve gastrectomy [Z98.84]    • Acute on chronic respiratory failure with  hypoxia and hypercapnia (HCC) [J96.21, J96.22]    • Essential hypertension, benign [I10]    • Morbid obesity (HCC) [E66.01]    • Obstructive sleep apnea on CPAP [G47.33, Z99.89]    • Venous stasis dermatitis of both lower extremities [I87.2]      Plan:    Pt is alert and oriented, NAD. Breathing unlabored, requiring on and off O2. Tolerating PO.  Incisions ok. VS stable. Labs reviewed.  Encouraged ambulation and incentive spirometry.  Wean O2.  Appreciate medicine and pulmonary's help with management of this patient.  Clear for discharge from a surgical standpoint.  Awaiting oxygen concentrator for Monday discharge per patient. Pt reviewed with Dr Clarke.    Quality Measures:  Quality-Core Measures    Discussed patient condition with Patient and Dr. Clarke

## 2021-08-09 NOTE — PROGRESS NOTES
[facility] Medicine Daily Progress Note    Date of Service  8/9/2021    Chief Complaint  Kulwinder Cason is a 60 y.o. male admitted 8/4/2021 for laparoscopic sleeve gastrectomy.    Hospital Course  Admitted for laparoscopic sleeve gastrectomy.  Known history of morbid obesity, obstructive sleep apnea.  Postoperatively he had acute respiratory failure with hypoxia and hypercapnia, attributed to probable obesity hypoventilation syndrome, sleep apnea, effects of anesthesia.  He received 2 doses of sugammadex, a dose of Narcan and he was placed on BiPAP.  He was started on IV Lasix for diuresis.  He was admitted to the ICU.  Pulmonary medicine has been consulted on the case.     Interval Problem Update  Respiratory failure - off O2 today  RUSS - CPAP, for outpatient sleep study   HTN - sbp 100-130  Sleeve Gastrectomy - pain controlled, tolerating clear liquids  Low potassium    I have personally seen and examined the patient at bedside. I discussed the plan of care with patient, bedside RN, charge RN,  and general surgery.    Consultants/Specialty  general surgery and pulmonary    Code Status  Full Code    Disposition  Patient is medically cleared.   Anticipate discharge to to home with organized home healthcare and close outpatient follow-up.  I have placed the appropriate orders for post-discharge needs.    Review of Systems  Review of Systems   Constitutional: Negative for chills, diaphoresis, fever and malaise/fatigue.   HENT: Negative for congestion, hearing loss and sore throat.    Eyes: Negative for blurred vision.   Respiratory: Negative for cough, shortness of breath and wheezing.    Cardiovascular: Positive for leg swelling. Negative for chest pain and palpitations.   Gastrointestinal: Positive for abdominal pain. Negative for diarrhea, heartburn, nausea and vomiting.   Genitourinary: Negative for dysuria, flank pain and hematuria.   Musculoskeletal: Negative for back pain, joint pain,  myalgias and neck pain.   Skin: Negative for rash.   Neurological: Negative for dizziness, sensory change, speech change, focal weakness, weakness and headaches.   Psychiatric/Behavioral: The patient is not nervous/anxious.         Physical Exam  Temp:  [36.1 °C (97 °F)-37 °C (98.6 °F)] 36.3 °C (97.4 °F)  Pulse:  [89-99] 99  Resp:  [16-17] 17  BP: (100-129)/(63-81) 100/63  SpO2:  [92 %-94 %] 92 %    Physical Exam  Vitals and nursing note reviewed.   Constitutional:       Appearance: He is obese.   HENT:      Head: Normocephalic and atraumatic.      Nose: No congestion.      Mouth/Throat:      Mouth: Mucous membranes are moist.   Eyes:      Extraocular Movements: Extraocular movements intact.      Conjunctiva/sclera: Conjunctivae normal.   Cardiovascular:      Rate and Rhythm: Normal rate and regular rhythm.   Pulmonary:      Effort: Pulmonary effort is normal.      Breath sounds: Decreased air movement present.   Abdominal:      General: There is no distension.      Tenderness: There is abdominal tenderness. There is no guarding or rebound.   Musculoskeletal:      Cervical back: No tenderness.      Right lower leg: Edema present.      Left lower leg: Edema present.   Skin:     Comments: Stasis dermatitis both legs   Neurological:      General: No focal deficit present.      Mental Status: He is alert and oriented to person, place, and time.      Cranial Nerves: No cranial nerve deficit.         Fluids    Intake/Output Summary (Last 24 hours) at 8/9/2021 1431  Last data filed at 8/9/2021 0831  Gross per 24 hour   Intake 400 ml   Output 900 ml   Net -500 ml       Laboratory  Recent Labs     08/07/21  1008 08/08/21  0218 08/09/21  0555   WBC 8.2 7.3 7.7   RBC 5.09 5.10 5.08   HEMOGLOBIN 14.0 14.1 13.9*   HEMATOCRIT 46.8 46.0 46.3   MCV 91.9 90.2 91.1   MCH 27.5 27.6 27.4   MCHC 29.9* 30.7* 30.0*   RDW 54.4* 51.8* 52.9*   PLATELETCT 279 276 238   MPV 9.0 9.2 8.8*     Recent Labs     08/07/21  1008 08/08/21  0352  08/09/21  0555   SODIUM 137 139 137   POTASSIUM 4.2 3.7 3.5*   CHLORIDE 96 97 95*   CO2 32 31 30   GLUCOSE 87 96 91   BUN 14 12 11   CREATININE 1.03 0.77 0.80   CALCIUM 8.8 8.6 8.8                   Imaging  DX-CHEST-PORTABLE (1 VIEW)   Final Result      1.  Bibasilar, RIGHT upper lobe and perihilar underinflation atelectasis which could obscure an additional process. This is unchanged.   2.  Persistently enlarged cardiac silhouette      DX-CHEST-LIMITED (1 VIEW)   Final Result      1.  Increase in bilateral atelectasis      2.  Enlarged cardiac silhouette           Assessment/Plan  * S/P laparoscopic sleeve gastrectomy- (present on admission)  Assessment & Plan  Pain control  Diet per surgery    Acute on chronic respiratory failure with hypoxia and hypercapnia (HCC)- (present on admission)  Assessment & Plan  Probable Obesity Hypoventilation Syndrome  Probable Pulmonary hypertension  RT protocol, Incentive spirometry  PT and OT  Lasix on discharge    Obstructive sleep apnea on CPAP- (present on admission)  Assessment & Plan  CPAP with oxygen supplementation  Will need outpatient sleep study    Hypokalemia- (present on admission)  Assessment & Plan  Start Kdur  Will need Kdur on discharge    Venous stasis dermatitis of both lower extremities- (present on admission)  Assessment & Plan  monitor    Essential hypertension, benign- (present on admission)  Assessment & Plan  Losartan    Morbid obesity (HCC)- (present on admission)  Assessment & Plan  Body mass index is 62.39 kg/m².       VTE prophylaxis: enoxaparin ppx    I have performed a physical exam and reviewed and updated ROS and Plan today (8/9/2021). In review of yesterday's note (8/8/2021), there are no changes except as documented above.

## 2021-08-09 NOTE — DISCHARGE PLANNING
SW followed up with RN, patient has received his walker at bedside. No other d/c needs per care coordination.

## 2021-08-09 NOTE — DISCHARGE PLANNING
SW sent off choice for DME: 4WW to [facility]. Once accepted, can be ordered through traction by RN and delivered to bedside.

## 2021-08-09 NOTE — PROGRESS NOTES
Received report from AM RNs'; assumed care. A&O x 4. VSS. 93% on RA noted on patient while in bed, dipping to 89 with neck flexion. Discussed. While sleepy, 1L NC applied due to maintaining in high 80's. RT applied CPAP. Patient denied SOB, numbness, tingling, nausea, vomiting, pain. Abdomen round, soft. Normoactive BS x 4 noted. Patient ambulating more during AM shift per patient/nurses' report. Increased swelling to LLE versus RLE. SCDs reapplied. Patient stated had system at home to help with leg swelling. + void, pale yellow urine noted. + eructation. + flatus. Multiple BMs during AM shift reported. Shower to be taken in AM. POC/impending discharge discussed. Questions answered. Bed locked/lowest position. Call light/personal belongings within reach. All needs met/patient sleeping at present time.

## 2021-08-09 NOTE — DISCHARGE INSTRUCTIONS
Discharge Instructions    Discharged to home by car with relative. Discharged via wheelchair, hospital escort: Yes.  Special equipment needed: Not Applicable    Be sure to schedule a follow-up appointment with your primary care doctor or any specialists as instructed.     Discharge Plan:        I understand that a diet low in cholesterol, fat, and sodium is recommended for good health. Unless I have been given specific instructions below for another diet, I accept this instruction as my diet prescription.   Other diet: bariatric diet    Special Instructions: bariatric diet and instructions    · Is patient discharged on Warfarin / Coumadin?   No     Depression / Suicide Risk    As you are discharged from this [facility] facility, it is important to learn how to keep safe from harming yourself.    Recognize the warning signs:  · Abrupt changes in personality, positive or negative- including increase in energy   · Giving away possessions  · Change in eating patterns- significant weight changes-  positive or negative  · Change in sleeping patterns- unable to sleep or sleeping all the time   · Unwillingness or inability to communicate  · Depression  · Unusual sadness, discouragement and loneliness  · Talk of wanting to die  · Neglect of personal appearance   · Rebelliousness- reckless behavior  · Withdrawal from people/activities they love  · Confusion- inability to concentrate     If you or a loved one observes any of these behaviors or has concerns about self-harm, here's what you can do:  · Talk about it- your feelings and reasons for harming yourself  · Remove any means that you might use to hurt yourself (examples: pills, rope, extension cords, firearm)  · Get professional help from the community (Mental Health, Substance Abuse, psychological counseling)  · Do not be alone:Call your Safe Contact- someone whom you trust who will be there for you.  · Call your local CRISIS HOTLINE 650-1278 or 731-777-4570  · Call  your local Children's Mobile Crisis Response Team [place] (027) 929-7752 or www.[employer]  · Call the toll free National Suicide Prevention Hotlines   · National Suicide Prevention Lifeline 210-261-JTMN (0446)  · National Hope Line Network 800-SUICIDE (003-7065)    Discharge Instructions    Discharged to home by car with friend. Discharged via wheelchair, hospital escort: Yes.  Special equipment needed: Walker    Be sure to schedule a follow-up appointment with your primary care doctor or any specialists as instructed.     Discharge Plan:        I understand that a diet low in cholesterol, fat, and sodium is recommended for good health. Unless I have been given specific instructions below for another diet, I accept this instruction as my diet prescription.   Other diet: follow booklet     Special Instructions: None    · Is patient discharged on Warfarin / Coumadin?   No     1. DIET: Follow the diet progression detailed in your post-op booklet.  Progressing as instructed will make for a smooth transition after surgery and prevent any pain associated with eating foods before your stomach is ready or eating too much.  Water and hydration is much more important than food intake the first week or two after surgery.  Drink enough water to keep your urine pale yellow.  Increase water intake if your urine is a darker yellow or if have burning with urination.  Nausea and vomiting once or twice after you leave the hospital is normal.  Sip fluids continually and stay hydrated.     2.  SUPPLEMENTS: Start your supplements 1 week after surgery.  You may need to cut some supplements in half initially.  Follow the guidelines from your supplement handout from your pre-op class.     3. ACTIVITIES: After discharge from the hospital, you may resume full routine activities. However, there should be no heavy lifting (greater than 15 pounds) and no strenuous activities until after your follow-up visit. Otherwise, routine  activities of daily living are acceptable.  More movement and walking after surgery the better.     4. DRIVING: You may drive whenever you are off pain medications and are able to perform the activities needed to drive, i.e. turning, bending, twisting, etc.     5. BATHING AND WOUND CARE: You may get the wound wet 2 days after surgery. You may shower, but do not submerge in a bath for at least a week. Dressings may come off after 48 hours. Please leave the steri-strips in place, they will fall off over 5-7 days. You may notice some clear or slightly bloody drainage from your wounds and there may be some mild redness or bruising around your incision sites.  This is normal.     6. BOWEL FUNCTION: Constipation is common after an operation, especially with pain medications. The combination of pain medication and decreased activity level can cause constipation in otherwise normal patients. If you feel this is occurring, take a laxative (Milk of Magnesia, Miralax, etc.) until the problem has resolved.  Diarrhea the first few days after surgery can also be normal.  You may notice that it is dark in color or see blood, which is also normal and should subside in the first week post-op.     7. PAIN MEDICATION: You have been given a prescription for pain medication preoperatively.  Please take these as directed. It is important to remember not to take medications on an empty stomach as this may cause nausea.  For minimal discomfort you may use liquid Tylenol 650 mg every 4 hours.  DO NOT exceed 4,000 mg of Tylenol in 24 hours.  DO NOT use non steroidal anti-inflamatory medication such as: Asprin, Ibuprofen, Advil, Motrin, Aleve, or steroids.  These medication can cause ulcers after weight loss surgery.     8.CALL IF YOU HAVE: (1) Fevers to more than 101.5 F, (2) leg pain or swelling (3) Drainage or fluid from incision that may be foul smelling, increased tenderness or soreness at the wound or the wound edges are no longer  together, redness or swelling at the incision site. (4) Night Sweats (5) Shaking, Chills (6) Persistent Nausea or Vomiting for over 24 hours.  Use your anti nausea medication as prescribed. (7) Call 911 if sudden onset of chest pain or shortness of breath that does not improve with 5-10 min of rest.     9. APPOINTMENT: Contact our office at [phone] for a follow-up appointment in 1-2 weeks following your procedure.  Our office hours are Monday-Friday, 8am-5pm.  Please try to call during these hours when we are better able to assist you.       Sleeve Gastrectomy, Care After  This sheet gives you information about how to care for yourself after your procedure. Your health care provider may also give you more specific instructions. If you have problems or questions, contact your health care provider.  What can I expect after the procedure?  After the procedure, it is common to have:  · Pain in the abdomen.  · Decreased appetite.  · Clear fluid leaking through the small tube (drain) that comes from your incision site.  Follow these instructions at home:  Medicines  · Take over-the-counter and prescription medicines only as told by your health care provider.  · Do not drive for 24 hours if you were given a sedative during your procedure.  · Do not drive or use heavy machinery while taking prescription pain medicine.  Incision and drain care    · Follow instructions from your health care provider about how to take care of your incisions. Make sure you:  ? Wash your hands with soap and water before and after you change your bandage (dressing). If soap and water are not available, use hand .  ? Change your dressing as told by your health care provider.  ? Leave stitches (sutures), skin glue, or adhesive strips in place. These skin closures may need to be in place for 2 weeks or longer. If adhesive strip edges start to loosen and curl up, you may trim the loose edges. Do not remove adhesive strips completely  unless your health care provider tells you to do that.  · Keep the area around your incisions and your drain clean and dry.  · Check your incision areas every day for signs of infection. Check for:  ? Redness, swelling, or pain.  ? Fluid or blood.  ? Warmth.  ? Pus or a bad smell.  · Empty your drain every day. Follow instructions from your health care provider about recording the amount of fluid that comes from your drain. Make note of any changes in the amount or appearance of the fluid.  Activity    · Rest as told by your health care provider.  · Avoid sitting for a long time without moving. Get up to take short walks every 1-2 hours. This is important to improve blood flow and breathing. Ask for help if you feel weak or unsteady.  · Return to your normal activities as told by your health care provider. Ask your health care provider what activities are safe for you.  · Do not lift anything that is heavier than 10 lb (4.5 kg), or the limit that you are told, until your health care provider says that it is safe.  · Avoid intense physical activity for as long as told by your health care provider.  Eating and drinking  · Follow instructions from your health care provider about eating or drinking restrictions. You will be given instructions about the type, the size, and the timing of your meals.  ? Keep track of any foods that cause discomfort, such as bloating or cramping.  ? Eat healthy foods. Avoid foods that are high in fat or sugar.  · Stop eating when you feel full.  · Take supplements only as told by your health care provider.  · Drink enough fluid to keep your urine pale yellow.  General instructions  · Do not take baths, swim, or use a hot tub until your health care provider approves. Ask your health care provider if you may take showers. You may only be allowed to take sponge baths.  · Do not use any products that contain nicotine or tobacco, such as cigarettes, e-cigarettes, and chewing tobacco. If you need  help quitting, ask your health care provider.  · Wear compression stockings as told by your health care provider. These stockings help to prevent blood clots and reduce swelling in your legs.  · Do breathing exercises as told by your health care provider.  · Keep all follow-up visits as told by your health care provider. This is important.  Contact a health care provider if you have:  · Pain that gets worse or does not get better with medicine.  · Redness, swelling, or pain around your incisions.  · Fluid or blood coming from your incisions.  · Incisions that feel warm to the touch.  · Pus or a bad smell coming from your incisions.  · A fever or chills.  · Problems with your drain.  · Green or bad-smelling fluid leaking from your drain.  Get help right away if you have:  · Trouble breathing.  · Severe pain, especially in your legs.  Summary  · After the procedure, it is common to have pain in the abdomen, decreased appetite, and clear fluid leaking from the drain in an incision site.  · Take over-the-counter and prescription medicines only as told by your health care provider.  · Follow instructions from your health care provider about how to take care of your incisions. Report any signs of infection to your health care provider.  · After surgery, get up to take short walks every 1-2 hours. This is important to improve blood flow and breathing. Follow instructions from your health care provider about eating or drinking restrictions.  · Get help right away if you have trouble breathing or severe pain, especially in your legs.  This information is not intended to replace advice given to you by your health care provider. Make sure you discuss any questions you have with your health care provider.  Document Released: 10/14/2010 Document Revised: 04/09/2020 Document Reviewed: 08/06/2019  Elsevier Patient Education © 2020 [employer]      Depression / Suicide Risk    As you are discharged from [facility]  facility, it is important to learn how to keep safe from harming yourself.    Recognize the warning signs:  · Abrupt changes in personality, positive or negative- including increase in energy   · Giving away possessions  · Change in eating patterns- significant weight changes-  positive or negative  · Change in sleeping patterns- unable to sleep or sleeping all the time   · Unwillingness or inability to communicate  · Depression  · Unusual sadness, discouragement and loneliness  · Talk of wanting to die  · Neglect of personal appearance   · Rebelliousness- reckless behavior  · Withdrawal from people/activities they love  · Confusion- inability to concentrate     If you or a loved one observes any of these behaviors or has concerns about self-harm, here's what you can do:  · Talk about it- your feelings and reasons for harming yourself  · Remove any means that you might use to hurt yourself (examples: pills, rope, extension cords, firearm)  · Get professional help from the community (Mental Health, Substance Abuse, psychological counseling)  · Do not be alone:Call your Safe Contact- someone whom you trust who will be there for you.  · Call your local CRISIS HOTLINE 319-1347 or 876-574-0178  · Call your local Children's Mobile Crisis Response Team [place] (584) 140-5593 or www.[employer]  · Call the toll free National Suicide Prevention Hotlines   · National Suicide Prevention Lifeline 031-274-XROT (6949)  · National Hope Line Network 800-SUICIDE (791-8867)

## 2021-08-09 NOTE — DIETARY
"Nutrition services: Day 5 of admit.  Kulwinder Cason is a 60 y.o. male with admitting DX of morbid obesity, s/p gastric surgery.    Pt is NPO/Clear liquid x 5 days.  Elective lap sleeve gastrectomy 8/4.  Per MD, pt developed significant hypoxic and hypercarbic respiratory failure, post op atelectasis, RUSS, prolonged action of anesthetic agents given an increased volume of distribution and underlying pulmonary hypertension.    Assessment:  Height: 188 cm (6' 2\")  Weight: (!) 220 kg (485 lb 14.3 oz)  Body mass index is 62.39 kg/m²., BMI classification: obese class III  Diet/Intake: clear liquid bariatric; PO % of meals    Malnutrition Risk: No new risk identified.    Recommendations/Plan:  1. Advance diet as tolerated per MD.    RD following.      "

## 2021-08-09 NOTE — DISCHARGE PLANNING
Received Choice form at 0816  Agency/Facility Name: [facility]   Referral sent per Choice form @ [phone]  Agency/Facility Name: [facility]  Spoke To: Yuriy   Outcome: Per Yuriy, referral was sent to [place] office and is currently being worked on     [phone]  Agency/Facility Name: [facility]  Spoke To: Mirella  Outcome: DPA informed Mirella that Pt was on room air now per IDT rounds

## 2021-08-09 NOTE — PROGRESS NOTES
Aaox4.   Iv dc. No meds in [facility]. Bariatric instructions and rx info given to pt.  Able to wait for ride.

## 2021-08-09 NOTE — FACE TO FACE
Face to Face Note  -  Durable Medical Equipment    Jarvis Florian M.D. - NPI: [id number]  I certify that this patient is under my care and that they had a durable medical equipment(DME)face to face encounter by myself that meets the physician DME face-to-face encounter requirements with this patient on:    Date of encounter:   Patient:                    MRN:                       YOB: 2019  Kulwinder Cason  [id number]  1960     The encounter with the patient was in whole, or in part, for the following medical condition, which is the primary reason for durable medical equipment:  Post-Op Surgery    I certify that, based on my findings, the following durable medical equipment is medically necessary:  Walkers.    HOME O2 Saturation Measurements:(Values must be present for Home Oxygen orders)  Room air sat at rest: 94  Room air sat with amb: 89  With liters of O2: 3, O2 sat at rest with O2: 95  With Liters of O2: 3, O2 sat with amb with O2 : 90  Is the patient mobile?: Yes    My Clinical findings support the need for the above equipment due to:  Abnormal Gait    Supporting Symptoms: weakness    If patient feels more short of breath, they can go up to 6 liters per minute and contact healthcare provider.

## 2021-08-09 NOTE — CARE PLAN
The patient is Stable - Low risk of patient condition declining or worsening    Shift Goals  Clinical Goals: Shower, titrate oxygen, rest.   Patient Goals: Shower, rest.   Family Goals: family not present    Progress made toward(s) clinical / shift goals:  Patient to shower in AM. Patient vacillating between needing/not needing oxygen during shift. Oxygen applied while sleepy/sleeping. CPAP applied by RT. Patient sleeping at present time.     Patient is not progressing towards the following goals: NA.

## 2021-10-06 NOTE — DISCHARGE SUMMARY
DATE OF ADMISSION:  08/04/2021   DATE OF DISCHARGE:  08/09/2021     PRINCIPAL PROCEDURE:  Laparoscopic sleeve gastrectomy.     PRINCIPAL DIAGNOSIS:  Morbid obesity with medical sequelae.     HISTORY OF PRESENT ILLNESS:  Briefly, the patient is a 60-year-old male with a   history of morbid obesity and medical sequelae including hypertension and   sleep apnea.  With a BMI of 58 kg per meters squared, he was admitted for   bariatric surgery.  He completed the preoperative education testing,   counseling and preparation process for sleeve gastrectomy.     HOSPITAL COURSE:  The patient was admitted to the hospital.  He underwent   laparoscopic sleeve gastrectomy.  The procedure was uncomplicated.    Postoperatively, he was monitored closely due to sleep apnea and oxygen   requirement and high body mass index.  He gradually improved and was able to   tolerate oral intake and was ambulating and voiding using a BiPAP and then   nasal cannula during the day.  He was evaluated by our medical colleagues who   continued to assist in his management of medical condition. By 08/09, he was   continuing to do much better with the hypoxia continuing to resolve,   tolerating oral intake and ambulating.  He was discharged home with home   oxygen and counseling, on a liquid diet.  Follow up in the office in the   coming days.     DISCHARGE CONDITION:  Good.     MEDICATIONS:  Include home Lovenox, Lortab elixir, multivitamins.     DIET:  As counseled and dietary instructions.     He was counseled to call or return if he has pain, redness, fever, nausea,   vomiting, shortness of breath, chest pain or other symptoms.        ______________________________  MD RUSH SKY/JAVIER    DD:  10/06/2021 14:59  DT:  10/06/2021 16:48    Job#:  [id number]

## 2021-10-20 ENCOUNTER — APPOINTMENT (OUTPATIENT)
Dept: SLEEP MEDICINE | Facility: MEDICAL CENTER | Age: 61
End: 2021-10-20
Payer: COMMERCIAL

## 2021-11-22 NOTE — PROGRESS NOTES
"CC: Reevaluation of previously diagnosed RUSS    HPI:  Kulwinder Cason is a 60 y.o. [place] resident and  for Yasmeen Quijano kindly referred by Dr. Denny Martin MD for reevaluation of previously diagnosed RUSS.      Symptom Summary:  Snoring: +  Very loud snoring: +  Witnessed apneas: +  Resuscitative snorts: +  Nocturnal shortness of breath: +  Non-restorative sleep: +  Insomnia: +  Nocturnal awakenings: +  Nocturia: +  EDS: +  Fatigue: +  Tiredness: +  Falls asleep accidentally: +   Napping or returning to bed after arising: +  Restless legs: -  Limb movements during sleep: +  Nocturnal headaches: +    Significant comorbidities and modifying factors include morbid obesity, status post laparoscopic sleeve gastrectomy, essential hypertension, stasis dermatitis, never smoker,      Patient Active Problem List    Diagnosis Date Noted   • Hypokalemia 08/09/2021   • S/P laparoscopic sleeve gastrectomy 08/07/2021   • Acute on chronic respiratory failure with hypoxia and hypercapnia (HCC) 08/04/2021   • Morbid obesity (HCC) 03/08/2021   • Obstructive sleep apnea on CPAP 03/08/2021   • Essential hypertension, benign 03/08/2021   • Venous stasis dermatitis of both lower extremities 03/08/2021       Past Medical History:   Diagnosis Date   • Arthritis    • Bowel habit changes    • Breath shortness 07/26/2021    Pt states \"related to weight and size\".   • Bronchitis 2020   • Hiatus hernia syndrome 2010    hernia repair-umbilical   • Hypertension    • IBS (irritable bowel syndrome)    • Lymphedema     Legs, abdomin and arms.  Uses a pump and compression hose.   • Obesity    • Pain 07/26/2021    Lymphadema pain. Neuropathy.   • Pickwickian syndrome (HCC)    • Pneumonia     In the past.   • Sleep apnea 07/26/2021    Uses CPAP.   • Snoring     RUSS-CPAP @ [facility]        Past Surgical History:   Procedure Laterality Date   • PB LAP, BHAVIK RESTRICT PROC, LONGITUDINAL GAS*  8/4/2021    Procedure: GASTRECTOMY, " SLEEVE, LAPAROSCOPIC.;  Surgeon: Christian Clarke M.D.;  Location: SURGERY [facility];  Service: General   • OTHER  2019    Colonoscopy   • CHOLECYSTECTOMY  2010    With hernia repair.   • OTHER      Right foot - tendonitis.   • OTHER ORTHOPEDIC SURGERY      Right meniscus.       Family History   Problem Relation Age of Onset   • Lung Disease Mother    • Hypertension Mother    • Hyperlipidemia Mother    • Stroke Mother    • Arthritis Father    • Heart Disease Father    • Parkinson's Disease Father    • Glaucoma Father    • Diabetes Sister    • Psychiatric Illness Sister    • Hypertension Maternal Grandmother    • Arthritis Maternal Grandfather    • Cancer Maternal Grandfather        Social History     Socioeconomic History   • Marital status:      Spouse name: Not on file   • Number of children: Not on file   • Years of education: Not on file   • Highest education level: Not on file   Occupational History   • Not on file   Tobacco Use   • Smoking status: Never Smoker   • Smokeless tobacco: Never Used   Vaping Use   • Vaping Use: Never used   Substance and Sexual Activity   • Alcohol use: Not Currently   • Drug use: Never   • Sexual activity: Not on file   Other Topics Concern   • Not on file   Social History Narrative   • Not on file     Social Determinants of Health     Financial Resource Strain:    • Difficulty of Paying Living Expenses: Not on file   Food Insecurity:    • Worried About Running Out of Food in the Last Year: Not on file   • Ran Out of Food in the Last Year: Not on file   Transportation Needs:    • Lack of Transportation (Medical): Not on file   • Lack of Transportation (Non-Medical): Not on file   Physical Activity:    • Days of Exercise per Week: Not on file   • Minutes of Exercise per Session: Not on file   Stress:    • Feeling of Stress : Not on file   Social Connections:    • Frequency of Communication with Friends and Family: Not on file   • Frequency of Social Gatherings with Friends  "and Family: Not on file   • Attends Qu[employer] Services: Not on file   • Active Member of Clubs or Organizations: Not on file   • Attends Club or Organization Meetings: Not on file   • Marital Status: Not on file   Intimate Partner Violence:    • Fear of Current or Ex-Partner: Not on file   • Emotionally Abused: Not on file   • Physically Abused: Not on file   • Sexually Abused: Not on file   Housing Stability:    • Unable to Pay for Housing in the Last Year: Not on file   • Number of Places Lived in the Last Year: Not on file   • Unstable Housing in the Last Year: Not on file       Current Outpatient Medications   Medication Sig Dispense Refill   • losartan (COZAAR) 100 MG Tab Take 100 mg by mouth at bedtime.     • Multiple Vitamins-Minerals (MULTIVITAMIN GUMMIES MENS PO) Take 2 Tablets by mouth every day.     • potassium Chloride ER (K-TAB) 20 MEQ Tab CR tablet Take 20 mEq by mouth 1 time a day as needed.       No current facility-administered medications for this visit.    \"CURRENT RX\"    ALLERGIES: Codeine and Latex    ROS  ***  Constitutional: Denies fever, chills, sweats,  weight loss, fatigue.  Eyes: Denies vision loss, pain, drainage, double vision, glasses.  Ears/Nose/Mouth/Throat: Denies earache, difficulty hearing, rhinitis/nasal congestion, injury, recurrent sore throat, persistent hoarseness, decayed teeth/toothaches, ringing or buzzing in the ears.  Cardiovascular: Denies chest pain, tightness, palpitations, swelling in legs/feet, fainting, difficulty breathing when lying down but gets better when sitting up.   Respiratory: Denies shortness of breath, cough, sputum, wheezing, painful breathing, coughing up blood.   Sleep: per HPI  Gastrointestinal: Denies  difficulty swallowing, nausea, abdominal pain, diarrhea, constipation, heartburn.  Genitourinary: Denies  blood in urine, discharge, frequent urination.   Musculoskeletal: Denies painful joints, sore muscles, back pain.   Integumentary: Denies rashes, " lumps, color changes.   Neurological: Denies frequent headaches,weakness, dizziness.    PHYSICAL EXAM  ***    There were no vitals taken for this visit.  Appearance: Well-nourished, well-developed, no acute distress  Eyes:  PERRLA, EOMI  ENMT: masked  Neck: Supple, trachea midline, no masses  Respiratory effort:  No intercostal retractions or use of accessory muscles  Lung auscultation:  No wheezes rhonchi rubs or rales  Cardiac: No murmurs, rubs, or gallops; regular rhythm, normal rate; no edema  Abdomen:  No tenderness, no organomegaly  Musculoskeletal:  Grossly normal; gait and station normal; digits and nails normal  Skin:  No rashes, petechiae, cyanosis  Neurologic: without focal signs; oriented to person, time, place, and purpose; judgement intact  Psychiatric:  No depression, anxiety, agitation        Medical Decision Making:  The medical record was reviewed in its entirety including the referral notes, records from primary care, consultants notes, hospital records, labs, imaging, microbiology, immunology, and immunizations.  Care gaps identified and reviewed with the patient.    Diagnostic and titration nocturnal polysomnograms, home sleep apnea tests, continuous nocturnal oximetry results, multiple sleep latency tests, and compliance reports reviewed.        There are no diagnoses linked to this encounter.    PLAN:   The patient has signs and symptoms consistent with obstructive sleep apnea hypopnea syndrome. Will schedule a nocturnal polysomnogram or a home sleep apnea test depending on signs and symptoms as well as insurance restrictions.    The risks of untreated sleep apnea were discussed with the patient at length. Patients with RUSS are at increased risk of cardiovascular disease including coronary artery disease, systemic arterial hypertension, pulmonary arterial hypertension, cardiac arrythmias, and stroke. RUSS patients have an increased risk of motor vehicle accidents, type 2 diabetes, chronic  kidney disease, and non-alcoholic liver disease. The patient was advised to avoid driving a motor vehicle when drowsy.    Positive airway pressure will favorably impact many of the adverse conditions and effects provoked by RUSS.    Have advised the patient to follow up with the appropriate healthcare practitioners for all other medical problems and issues.    Mask wearing, handwashing, and social distancing protocols reviewed and encouraged.        ***  No follow-ups on file.    Total time 45 minutes

## 2021-11-29 ENCOUNTER — OFFICE VISIT (OUTPATIENT)
Dept: SLEEP MEDICINE | Facility: MEDICAL CENTER | Age: 61
End: 2021-11-29
Payer: COMMERCIAL

## 2022-01-01 ENCOUNTER — OFFICE VISIT (OUTPATIENT)
Dept: CARDIOLOGY | Facility: MEDICAL CENTER | Age: 62
End: 2022-01-01
Payer: COMMERCIAL

## 2022-01-01 ENCOUNTER — TELEPHONE (OUTPATIENT)
Dept: CARDIOLOGY | Facility: MEDICAL CENTER | Age: 62
End: 2022-01-01
Payer: COMMERCIAL

## 2022-01-01 VITALS
RESPIRATION RATE: 16 BRPM | WEIGHT: 315 LBS | BODY MASS INDEX: 40.43 KG/M2 | HEART RATE: 90 BPM | HEIGHT: 74 IN | OXYGEN SATURATION: 94 % | DIASTOLIC BLOOD PRESSURE: 84 MMHG | SYSTOLIC BLOOD PRESSURE: 130 MMHG

## 2022-01-01 DIAGNOSIS — I51.7 RIGHT HEART ENLARGEMENT: ICD-10-CM

## 2022-01-01 PROCEDURE — 99214 OFFICE O/P EST MOD 30 MIN: CPT | Performed by: INTERNAL MEDICINE

## 2022-01-01 ASSESSMENT — FIBROSIS 4 INDEX: FIB4 SCORE: 0.89

## 2022-12-13 PROBLEM — I89.0 LYMPHEDEMA OF BOTH LOWER EXTREMITIES: Status: ACTIVE | Noted: 2021-03-17

## 2022-12-13 NOTE — PROGRESS NOTES
"    Cardiology follow-up consultation Note        Chief Complaint: Right heart enlargement, morbid obesity    Kulwinder Cason is a 61 y.o. male  patient presented today for follow up. Since last evaluated by me he has laparoscopic sleep gastrectomy. He lost about 100 pounds. He feels well currently. Lower extremity edema is better. He feels better overall.         Past Medical History:   Diagnosis Date    Arthritis     Bowel habit changes     Breath shortness 07/26/2021    Pt states \"related to weight and size\".    Bronchitis 2020    Hiatus hernia syndrome 2010    hernia repair-umbilical    Hypertension     IBS (irritable bowel syndrome)     Lymphedema     Legs, abdomin and arms.  Uses a pump and compression hose.    Obesity     Pain 07/26/2021    Lymphadema pain. Neuropathy.    Pickwickian syndrome (HCC)     Pneumonia     In the past.    Sleep apnea 07/26/2021    Uses CPAP.    Snoring     RUSS-CPAP @ [facility]         Past Surgical History:   Procedure Laterality Date    AK LAP, BHAVIK RESTRICT PROC, LONGITUDINAL GAS*  8/4/2021    Procedure: GASTRECTOMY, SLEEVE, LAPAROSCOPIC.;  Surgeon: Christian Clarke M.D.;  Location: SURGERY [facility];  Service: General    OTHER  2019    Colonoscopy    CHOLECYSTECTOMY  2010    With hernia repair.    OTHER      Right foot - tendonitis.    OTHER ORTHOPEDIC SURGERY      Right meniscus.         Current Outpatient Medications   Medication Sig Dispense Refill    Furosemide (LASIX PO) Take  by mouth.      losartan (COZAAR) 100 MG Tab Take 100 mg by mouth at bedtime.      Multiple Vitamins-Minerals (MULTIVITAMIN GUMMIES MENS PO) Take 2 Tablets by mouth every day.      potassium Chloride ER (K-TAB) 20 MEQ Tab CR tablet Take 20 mEq by mouth 1 time a day as needed.       No current facility-administered medications for this visit.         Allergies   Allergen Reactions    Codeine Nausea    Latex Rash     gloves         Family History   Problem Relation Age of Onset    Lung Disease Mother     " "Hypertension Mother     Hyperlipidemia Mother     Stroke Mother     Arthritis Father     Heart Disease Father     Parkinson's Disease Father     Glaucoma Father     Diabetes Sister     Psychiatric Illness Sister     Hypertension Maternal Grandmother     Arthritis Maternal Grandfather     Cancer Maternal Grandfather          Social History     Socioeconomic History    Marital status:      Spouse name: Not on file    Number of children: Not on file    Years of education: Not on file    Highest education level: Not on file   Occupational History    Not on file   Tobacco Use    Smoking status: Never    Smokeless tobacco: Never   Vaping Use    Vaping Use: Never used   Substance and Sexual Activity    Alcohol use: Not Currently    Drug use: Never    Sexual activity: Not on file   Other Topics Concern    Not on file   Social History Narrative    Not on file     Social Determinants of Health     Financial Resource Strain: Not on file   Food Insecurity: Not on file   Transportation Needs: Not on file   Physical Activity: Not on file   Stress: Not on file   Social Connections: Not on file   Intimate Partner Violence: Not on file   Housing Stability: Not on file         Physical Exam:  Ambulatory Vitals  /84 (BP Location: Left arm, Patient Position: Sitting, BP Cuff Size: Adult)   Pulse 90   Resp 16   Ht 1.88 m (6' 2\")   Wt (!) 184 kg (405 lb)   SpO2 94%    Oxygen Therapy:  Pulse Oximetry: 94 %  BP Readings from Last 4 Encounters:   12/13/22 130/84   08/09/21 100/63   03/08/21 134/88       Weight/BMI: Body mass index is 52 kg/m².  Wt Readings from Last 4 Encounters:   12/13/22 (!) 184 kg (405 lb)   08/05/21 (!) 220 kg (485 lb 14.3 oz)   07/26/21 (!) 233 kg (513 lb 10.8 oz)   03/08/21 (!) 218 kg (481 lb)       General: Well appearing and in no apparent distress  Head: atrumatic  Eyes: No conjunctival pallor   ENT: normal external appearance of nose and ears  Neck: JVD absent, carotid bruits absent  Lungs: " respiratory sounds  normal, additional breath sounds absent  Heart: Regular rhythm,   No palpable thrills on palpation, murmurs absent, no rubs,   Lower extremity edema 3+.     Echocardiogram 2020 reviewed shows normal LV function, mildly dilated right ventricle, no significant TR to quantify RVSP.    EKG from 10/22/2020 reviewed, personally interpreted shows normal sinus rhythm    Hospital discharge notes reviewed, op notes reviewed.      Medical Decision Makin-year-old male patient with morbid obesity, significant lower extremity edema, chronic venous insufficiency, mild RV enlargement.    He feels better after weight loss surgery, losing 100 pounds.  He continues to lose weight.  We will repeat echocardiogram to follow-up on right ventricular enlargement.  Blood pressure is well controlled.  If he cannot lose anymore weight, recommend medical therapy with Ozempic.        This note was dictated using Dragon speech recognition software.    Anthony BAILEY  Interventional cardiologist  [facility], [address]  [address] [address]  Phone: [phone]  Fax: [phone]

## 2022-12-14 NOTE — TELEPHONE ENCOUNTER
AK     Caller: Kulwinder Colin Yoav    Topic/issue: Kulwinder would like his order for an echocardiogram sent to [facility] as it is closer to where he lives. Patient states order number is [id number]. Please advise.     Callback Number: [phone] (home)     Thank you,   Evelyn DIOP

## 2022-12-14 NOTE — TELEPHONE ENCOUNTER
AK    Caller: Kulwinder Cason     Topic/issue: Pt called back again to let us know that [facility] has yet to receive the order for his echo, I did tell him that it might take a little bit for it to send to [facility] because he was seen just yesterday by , Patient states order number is [id number]. Please advise.    Callback Number: [phone] (home)       Thanks,  Karlos MARIN

## 2022-12-16 NOTE — TELEPHONE ENCOUNTER
AK     Caller: Kulwinder Cason    Topic/issue: Patient is calling back in regards to having his order for an echocardiogram sent to [facility]. He has provided us their fax number: [phone]. Patient is becoming frustrated that his order has not yet been sent. I advised him of our timeframe. Patient would like to be contacted once completed. Please advise.     Callback Number: [phone] (home)     Thank you,   Evelyn DIOP

## 2022-12-19 NOTE — TELEPHONE ENCOUNTER
AK        Caller: Kulwinder Cason        Topic/issue: Patient was calling to follow up on having his ECHO order sent over to [facility]. Fax# [phone]. He frustrated that the order has not been received by them and was asking for a call back once its completed      Callback Number: [phone]      Thank you      -Yomi CAMPOS

## 2022-12-21 NOTE — TELEPHONE ENCOUNTER
Caller:Geovanna Goss    Topic/issue: Requesting prior authorization for this echo referral.     Callback Number:  [phone]    Thank you,   Africa SUAREZ

## 2023-01-01 ENCOUNTER — PATIENT MESSAGE (OUTPATIENT)
Dept: CARDIOLOGY | Facility: MEDICAL CENTER | Age: 63
End: 2023-01-01
Payer: COMMERCIAL

## 2023-01-01 ENCOUNTER — TELEPHONE (OUTPATIENT)
Dept: CARDIOLOGY | Facility: MEDICAL CENTER | Age: 63
End: 2023-01-01
Payer: COMMERCIAL

## 2023-01-01 DIAGNOSIS — I51.7 RIGHT HEART ENLARGEMENT: ICD-10-CM

## 2023-03-06 NOTE — TELEPHONE ENCOUNTER
Phone Number Called: [phone]    Call outcome: Spoke to patient regarding message below.    Message: Called to discuss concerns patient had with Echo results. Patient was wondering if his heart is in stable enough condition to return to work?     AK-Please advise.

## 2023-03-06 NOTE — TELEPHONE ENCOUNTER
Anthony Sellers M.D.  You 24 minutes ago (12:43 PM)       Yes he can go to work.  Everything is stable on his echocardiogram.  Mild RV enlargement.

## 2023-03-06 NOTE — TELEPHONE ENCOUNTER
"Phone Number Called: [phone]    Call outcome: Spoke to patient regarding message below.    Message: Called to inform patient of AK recommendations, \"he can go back to work.\"  "

## 2023-03-06 NOTE — TELEPHONE ENCOUNTER
AK    Caller: Kulwinder Cason     Topic/issue: Pt would like a call back to discuss his test results and to discuss him going back to work.     Callback Number:  [phone] (home)     Thank you    Jennifer MORA

## (undated) DEVICE — DRAPESURG STERI-DRAPE LONG - (10/BX 4BX/CA)

## (undated) DEVICE — SCISSORS 5MM CVD (6EA/BX)

## (undated) DEVICE — TROCAR SEPARATOR 15MMZTHREAD - (6/BX)

## (undated) DEVICE — PERISTRIP 60 STAPLE LINE REINFORCEMENT (6EA/CA)

## (undated) DEVICE — SET LEADWIRE 5 LEAD BEDSIDE DISPOSABLE ECG (1SET OF 5/EA)

## (undated) DEVICE — CHLORAPREP 26 ML APPLICATOR - ORANGE TINT(25/CA)

## (undated) DEVICE — SUCTION INSTRUMENT YANKAUER BULBOUS TIP W/O VENT (50EA/CA)

## (undated) DEVICE — SET TUBING PNEUMOCLEAR HIGH FLOW SMOKE EVACUATION (10EA/BX)

## (undated) DEVICE — GLOVE BIOGEL PI INDICATOR SZ 7.0 SURGICAL PF LF - (50/BX 4BX/CA)

## (undated) DEVICE — MAT PATIENT POSITIONING PREVALON (10EA/CA)

## (undated) DEVICE — TUBING CLEARLINK DUO-VENT - C-FLO (48EA/CA)

## (undated) DEVICE — SYSTEM CALIBARATION  GASTRECTOMY 40FR WITH BULB

## (undated) DEVICE — SUTURE 4-0 VICRYL PLUSFS-1 - 27 INCH (36/BX)

## (undated) DEVICE — RELOAD WITH GRIPPING SURGACE TECHNOLOGY GREEN 60MM (12EA/BX)

## (undated) DEVICE — TOWEL STOP TIMEOUT SAFETY FLAG (40EA/CA)

## (undated) DEVICE — APPLICATOR DUPLO SPRAYER (5EA/CA)

## (undated) DEVICE — ELECTRODE DUAL RETURN W/ CORD - (50/PK)

## (undated) DEVICE — GOWN WARMING X-LARGE FLEX - (20/CA)

## (undated) DEVICE — RELOAD WITH GRIPPING SURFACE TECHNOLOGY BLUE 60MM (12EA/BX)

## (undated) DEVICE — KIT ANESTHESIA W/CIRCUIT & 3/LT BAG W/FILTER (20EA/CA)

## (undated) DEVICE — ELECTRODE 850 FOAM ADHESIVE - HYDROGEL RADIOTRNSPRNT (50/PK)

## (undated) DEVICE — SUTURE 0 LIGATING REEL VICRYL PLUS (12PK/BX)

## (undated) DEVICE — TROCAR 5X100 NON BLADED Z-TH - READ KII (6/BX)

## (undated) DEVICE — STAPLER POWERED 60MM (3EA/BX)

## (undated) DEVICE — LACTATED RINGERS INJ 1000 ML - (14EA/CA 60CA/PF)

## (undated) DEVICE — PROTECTOR ULNA NERVE - (36PR/CA)

## (undated) DEVICE — SENSOR SPO2 NEO LNCS ADHESIVE (20/BX) SEE USER NOTES

## (undated) DEVICE — SLEEVE VASO THIGH BARIATRI - (10PR/CA)

## (undated) DEVICE — SUTURE GENERAL

## (undated) DEVICE — GLOVE SZ 7 BIOGEL PI MICRO - PF LF (50PR/BX 4BX/CA)

## (undated) DEVICE — RELOAD WITH GRIPPING SURFACE TECHNOLOGY GOLD 60MM (12EA/BX)

## (undated) DEVICE — TISSEEL 4ML ----MUST ORDER A MIN OF 6EA----

## (undated) DEVICE — CANISTER SUCTION 3000ML MECHANICAL FILTER AUTO SHUTOFF MEDI-VAC NONSTERILE LF DISP  (40EA/CA)

## (undated) DEVICE — MASK ANESTHESIA ADULT  - (100/CA)

## (undated) DEVICE — SODIUM CHL IRRIGATION 0.9% 1000ML (12EA/CA)

## (undated) DEVICE — HEAD HOLDER JUNIOR/ADULT

## (undated) DEVICE — CANNULA W/SEAL 5X100 Z-THRE - ADED KII (12/BX)

## (undated) DEVICE — SET EXTENSION WITH 2 PORTS (48EA/CA) ***PART #2C8610 IS A SUBSTITUTE*****

## (undated) DEVICE — NEPTUNE 4 PORT MANIFOLD - (20/PK)

## (undated) DEVICE — SUTURE2-0 27IN VCRL ANTI VIOL (36PK/BX)

## (undated) DEVICE — CLIP APPLIER 10MM ENDO LARGE (3EA/BX)

## (undated) DEVICE — PACK GASTRIC BANDING OR - (1/CA)

## (undated) DEVICE — BAG RETRIEVAL 12/15 MM INZII (5EA/CA) THIS WILL REPLACE ITEM 75018

## (undated) DEVICE — HANDPIECE THUNDERBEAT 5MM 35CM FRONT GRIP TYPE S (5EA/BX)